# Patient Record
Sex: MALE | Race: WHITE | Employment: FULL TIME | URBAN - METROPOLITAN AREA
[De-identification: names, ages, dates, MRNs, and addresses within clinical notes are randomized per-mention and may not be internally consistent; named-entity substitution may affect disease eponyms.]

---

## 2018-02-05 ENCOUNTER — OFFICE VISIT (OUTPATIENT)
Dept: INTERNAL MEDICINE CLINIC | Age: 53
End: 2018-02-05

## 2018-02-05 VITALS
HEIGHT: 69 IN | BODY MASS INDEX: 22.6 KG/M2 | TEMPERATURE: 98.6 F | DIASTOLIC BLOOD PRESSURE: 92 MMHG | OXYGEN SATURATION: 97 % | HEART RATE: 82 BPM | SYSTOLIC BLOOD PRESSURE: 128 MMHG | WEIGHT: 152.6 LBS

## 2018-02-05 DIAGNOSIS — Z23 NEED FOR VACCINE FOR TD (TETANUS-DIPHTHERIA): ICD-10-CM

## 2018-02-05 DIAGNOSIS — E55.9 VITAMIN D DEFICIENCY: ICD-10-CM

## 2018-02-05 DIAGNOSIS — L30.8 OTHER ECZEMA: ICD-10-CM

## 2018-02-05 DIAGNOSIS — Z12.11 COLON CANCER SCREENING: ICD-10-CM

## 2018-02-05 DIAGNOSIS — B35.1 ONYCHOMYCOSIS: ICD-10-CM

## 2018-02-05 DIAGNOSIS — Z00.00 PHYSICAL EXAM: Primary | ICD-10-CM

## 2018-02-05 DIAGNOSIS — Z80.42 FAMILY HISTORY OF PROSTATE CANCER: ICD-10-CM

## 2018-02-05 RX ORDER — CLOBETASOL PROPIONATE 0.5 MG/G
OINTMENT TOPICAL 2 TIMES DAILY
Qty: 15 G | Refills: 0 | Status: SHIPPED | OUTPATIENT
Start: 2018-02-05 | End: 2018-12-11 | Stop reason: SDUPTHER

## 2018-02-05 NOTE — PROGRESS NOTES
Written by Sylvia Brar, as dictated by Dr. Rowe Crigler, MD.    [facility]  Fco Stewart is a 50 y.o. male. HPI  The patient comes in today to establish care. He is due for a colonoscopy and would like a referral to GI to have one done. His BP is high today at 130/100, 128/92 on repeat. This is unusual for him as his diastolic BP is usually around 80. He has a fhx (father, age 72) of prostate cancer. His PSA was last checked in 2008 and was normal.    He has noticed an itchy rash on his R hand, which is worse in the winter and when he cleans his hands before surgery. He has been applying OTC eczema cream. He has never used a steroid cream.    He has noticed some toenail fungus for the last 3 months, but has not tried anything on it yet. He got his flu shot this season. He is not up to date on his Tdap. He has a hx of vitamin D deficiency but only takes Ca/vitamin D supplements when he remembers. He exercises daily, does not smoke, and drinks alcohol occasionally. Past Surgical History:   Procedure Laterality Date    HX ORTHOPAEDIC  05/2016   Chuy Chandler REFRACTIVE SURGERY      HX TONSIL AND ADENOIDECTOMY         Family History   Problem Relation Age of Onset    Prostate Cancer Father     Prostate Cancer Paternal Grandfather        Social History     Social History    Marital status: UNKNOWN     Spouse name: N/A    Number of children: N/A    Years of education: N/A     Occupational History    Not on file. Social History Main Topics    Smoking status: Never Smoker    Smokeless tobacco: Never Used    Alcohol use No    Drug use: Not on file    Sexual activity: Not on file     Other Topics Concern    Not on file     Social History Narrative       Review of Systems   Constitutional: Negative for malaise/fatigue. HENT: Negative for congestion. Eyes: Negative for blurred vision and pain. Respiratory: Negative for cough and shortness of breath. Cardiovascular: Negative for chest pain and palpitations. Gastrointestinal: Negative for abdominal pain and heartburn. Genitourinary: Negative for frequency and urgency. Musculoskeletal: Negative for joint pain and myalgias. Skin: Positive for itching and rash. Neurological: Negative for dizziness, tingling, sensory change, weakness and headaches. Psychiatric/Behavioral: Negative for depression, memory loss and substance abuse. Visit Vitals    BP (!) 128/92 (BP 1 Location: Right arm, BP Patient Position: Sitting)    Pulse 82    Temp 98.6 °F (37 °C) (Oral)    Ht 5' 9\" (1.753 m)    Wt 152 lb 9.6 oz (69.2 kg)    SpO2 97%    BMI 22.54 kg/m2       Physical Exam   Constitutional: He is oriented to person, place, and time. He appears well-developed and well-nourished. No distress. HENT:   Right Ear: External ear normal.   Left Ear: External ear normal.   Eyes: Conjunctivae and EOM are normal. Right eye exhibits no discharge. Left eye exhibits no discharge. Neck: Normal range of motion. Neck supple. Cardiovascular: Normal rate and regular rhythm. Pulses:       Dorsalis pedis pulses are 2+ on the right side, and 2+ on the left side. Pulmonary/Chest: Effort normal and breath sounds normal. He has no wheezes. Abdominal: Soft. Bowel sounds are normal. There is no tenderness. Lymphadenopathy:     He has no cervical adenopathy. Neurological: He is alert and oriented to person, place, and time. Skin: He is not diaphoretic. Yellowish discoloration of both toe nails. Psychiatric: He has a normal mood and affect. His behavior is normal.   Nursing note and vitals reviewed. ASSESSMENT and PLAN    ICD-10-CM ICD-9-CM    1. Physical exam X72.31 Q65.3 METABOLIC PANEL, COMPREHENSIVE      CBC W/O DIFF      LIPID PANEL      TSH 3RD GENERATION    Complete physical exam done. Basic labs drawn. I would like him to check his BP 5-6 times and send me the readings.    2. Vitamin D deficiency E55.9 268.9 VITAMIN D, 25 HYDROXY    Will recheck vitamin D today. Compliant on vitamin D supplements. 3. Family history of prostate cancer Z80.42 V16.42 PSA, DIAGNOSTIC (PROSTATE SPECIFIC AG)    Will recheck PSA today. 4. Colon cancer screening Z12.11 V76.51 REFERRAL TO GASTROENTEROLOGY    Referred to GI.   5. Other eczema L30.8 692.9 clobetasol (TEMOVATE) 0.05 % ointment sent to pharmacy    Clobetasol cream given. 6. Need for vaccine for TD (tetanus-diphtheria) Z23 V06.5 diph,Pertuss,Acell,,Tet Vac-PF (ADACEL) 2 Lf-(2.5-5-3-5 mcg)-5Lf/0.5 mL susp sent to pharmacy    Tdap sent to pharmacy   7. Onychomycosis B35.1 110.1 efinaconazole (JUBLIA) rene topical solution sent to pharmacy    Jublia given. Suggested 1 part bleach 1 part water foot soak for 10 minutes daily. This plan was reviewed with the patient and patient agrees. All questions were answered. This scribe documentation was reviewed by me and accurately reflects the examination and decisions made by me. This note will not be viewable in [address]

## 2018-02-05 NOTE — PROGRESS NOTES
Pt is here to be seen as a new patient to reestablish care, he was last seen in 2011. He would like to have lab work done along with psa due to family history of prostate cancer. 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 During this visit he is also requesting a colonoscopy referral.   Chief Complaint   Patient presents with   [address]     would like psa due to family history     Other     would like colonoscopy referral.     Rash     for a few years      Visit Vitals    BP (!) 130/100 (BP 1 Location: Right arm, BP Patient Position: Sitting)    Pulse 82    Temp 98.6 °F (37 °C) (Oral)    Ht 5' 9\" (1.753 m)    Wt 152 lb 9.6 oz (69.2 kg)    SpO2 97%    BMI 22.54 kg/m2     1. Have you been to the ER, urgent care clinic since your last visit? Hospitalized since your last visit? No    2. Have you seen or consulted any other health care providers outside of the [address] [place] [address] since your last visit? Include any pap smears or colon screening.  No

## 2018-02-06 LAB
25(OH)D3+25(OH)D2 SERPL-MCNC: 27.2 NG/ML (ref 30–100)
ALBUMIN SERPL-MCNC: 5.1 G/DL (ref 3.5–5.5)
ALBUMIN/GLOB SERPL: 2.2 {RATIO} (ref 1.2–2.2)
ALP SERPL-CCNC: 52 IU/L (ref 39–117)
ALT SERPL-CCNC: 27 IU/L (ref 0–44)
AST SERPL-CCNC: 41 IU/L (ref 0–40)
BILIRUB SERPL-MCNC: 0.5 MG/DL (ref 0–1.2)
BUN SERPL-MCNC: 20 MG/DL (ref 6–24)
BUN/CREAT SERPL: 19 (ref 9–20)
CALCIUM SERPL-MCNC: 9.8 MG/DL (ref 8.7–10.2)
CHLORIDE SERPL-SCNC: 99 MMOL/L (ref 96–106)
CHOLEST SERPL-MCNC: 208 MG/DL (ref 100–199)
CO2 SERPL-SCNC: 27 MMOL/L (ref 18–29)
CREAT SERPL-MCNC: 1.03 MG/DL (ref 0.76–1.27)
GFR SERPLBLD CREATININE-BSD FMLA CKD-EPI: 84 ML/MIN/1.73
GFR SERPLBLD CREATININE-BSD FMLA CKD-EPI: 97 ML/MIN/1.73
GLOBULIN SER CALC-MCNC: 2.3 G/DL (ref 1.5–4.5)
GLUCOSE SERPL-MCNC: 100 MG/DL (ref 65–99)
HCT VFR BLD AUTO: NORMAL %
HDLC SERPL-MCNC: 115 MG/DL
HGB BLD-MCNC: NORMAL G/DL
INTERPRETATION, 910389: NORMAL
LDLC SERPL CALC-MCNC: 85 MG/DL (ref 0–99)
NRBC BLD AUTO-RTO: NORMAL %
PLATELET # BLD AUTO: NORMAL 10*3/UL
POTASSIUM SERPL-SCNC: 5.2 MMOL/L (ref 3.5–5.2)
PROT SERPL-MCNC: 7.4 G/DL (ref 6–8.5)
PSA SERPL-MCNC: 0.9 NG/ML (ref 0–4)
RBC # BLD AUTO: NORMAL 10*6/UL
SODIUM SERPL-SCNC: 142 MMOL/L (ref 134–144)
TRIGL SERPL-MCNC: 40 MG/DL (ref 0–149)
TSH SERPL DL<=0.005 MIU/L-ACNC: 1.34 UIU/ML (ref 0.45–4.5)
VLDLC SERPL CALC-MCNC: 8 MG/DL (ref 5–40)
WBC # BLD AUTO: NORMAL X10E3/UL

## 2018-02-06 NOTE — PROGRESS NOTES
Lupe added to patient labs. Westleyayaz Khan said that the CBC was cancelled due to her not collecting enough of the sample.

## 2018-02-06 NOTE — PROGRESS NOTES
Please add HbA1C to his blood work. Also, find out from Gutierrez Morton why  was his CBC cancelled?

## 2018-02-09 ENCOUNTER — TELEPHONE (OUTPATIENT)
Dept: INTERNAL MEDICINE CLINIC | Age: 53
End: 2018-02-09

## 2018-02-12 DIAGNOSIS — B35.1 ONYCHOMYCOSIS: ICD-10-CM

## 2018-02-12 NOTE — TELEPHONE ENCOUNTER
Pt requesting that this medication be resent to the pharmacy since they have not received the one that was sent on 2/5/18 thank you.

## 2018-02-12 NOTE — PROGRESS NOTES
Yes I sent him a message on Friday on [employer] I also tried calling him and his phone just rang and voicemail was full.

## 2018-02-12 NOTE — PROGRESS NOTES
Spoke with patient and confirmed patient identity x2. Reviewed results and lab work that was needed. Patient will be coming in to have labs redrawn. He states that the Jublia that was sent in was never received by the pharmacy.

## 2018-02-13 ENCOUNTER — DOCUMENTATION ONLY (OUTPATIENT)
Dept: INTERNAL MEDICINE CLINIC | Age: 53
End: 2018-02-13

## 2018-02-13 DIAGNOSIS — R73.09 ELEVATED GLUCOSE: Primary | ICD-10-CM

## 2018-02-13 DIAGNOSIS — Z00.00 PHYSICAL EXAM: ICD-10-CM

## 2018-02-14 LAB
ERYTHROCYTE [DISTWIDTH] IN BLOOD BY AUTOMATED COUNT: 13.5 % (ref 12.3–15.4)
EST. AVERAGE GLUCOSE BLD GHB EST-MCNC: 103 MG/DL
HBA1C MFR BLD: 5.2 % (ref 4.8–5.6)
HCT VFR BLD AUTO: 42.2 % (ref 37.5–51)
HGB BLD-MCNC: 14 G/DL (ref 13–17.7)
MCH RBC QN AUTO: 32.7 PG (ref 26.6–33)
MCHC RBC AUTO-ENTMCNC: 33.2 G/DL (ref 31.5–35.7)
MCV RBC AUTO: 99 FL (ref 79–97)
PLATELET # BLD AUTO: 170 X10E3/UL (ref 150–379)
RBC # BLD AUTO: 4.28 X10E6/UL (ref 4.14–5.8)
WBC # BLD AUTO: 4.6 X10E3/UL (ref 3.4–10.8)

## 2018-02-14 NOTE — PROGRESS NOTES
[place] Both, your vitamin D came back little low. I would suggest taking 1000 I.U daily dose. Total cholesterol in upper normal range. No jyoti to take medication , exercise & low carb diet should work.

## 2018-03-01 ENCOUNTER — DOCUMENTATION ONLY (OUTPATIENT)
Dept: INTERNAL MEDICINE CLINIC | Age: 53
End: 2018-03-01

## 2018-03-23 ENCOUNTER — DOCUMENTATION ONLY (OUTPATIENT)
Dept: INTERNAL MEDICINE CLINIC | Age: 53
End: 2018-03-23

## 2018-03-23 NOTE — PROGRESS NOTES
Extra questions were needed for the prior auth and has been submittied on 3/20/2018 checked web site still unanaswere

## 2018-04-06 ENCOUNTER — DOCUMENTATION ONLY (OUTPATIENT)
Dept: INTERNAL MEDICINE CLINIC | Age: 53
End: 2018-04-06

## 2018-04-16 ENCOUNTER — TELEPHONE (OUTPATIENT)
Dept: INTERNAL MEDICINE CLINIC | Age: 53
End: 2018-04-16

## 2018-04-16 DIAGNOSIS — R20.2 FACIAL PARESTHESIA: Primary | ICD-10-CM

## 2018-04-16 DIAGNOSIS — B35.1 ONYCHOMYCOSIS: ICD-10-CM

## 2018-04-23 ENCOUNTER — OFFICE VISIT (OUTPATIENT)
Dept: NEUROLOGY | Age: 53
End: 2018-04-23

## 2018-04-23 VITALS
DIASTOLIC BLOOD PRESSURE: 82 MMHG | WEIGHT: 155 LBS | BODY MASS INDEX: 22.89 KG/M2 | SYSTOLIC BLOOD PRESSURE: 118 MMHG | HEART RATE: 65 BPM | OXYGEN SATURATION: 98 % | RESPIRATION RATE: 18 BRPM

## 2018-04-23 DIAGNOSIS — R20.0 NUMBNESS OF LEFT JAW: Primary | ICD-10-CM

## 2018-04-23 NOTE — PROGRESS NOTES
Chief Complaint   Patient presents with    Numbness       Referred by: Dr. 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 He tells me for the past 2 weeks he has noticed left sub-mandible numbness while shaving. It occurs exclusively on the left side without change in pattern since being noticed. No pain. He denies any other complaints such as headache or vision changes or weakness or numbness elsewhere. Review of Systems   Neurological: Positive for tingling and sensory change. All other systems reviewed and are negative. History reviewed. No pertinent past medical history. Family History   Problem Relation Age of Onset    Prostate Cancer Father     Prostate Cancer Paternal Grandfather      Social History     Social History    Marital status: UNKNOWN     Spouse name: N/A    Number of children: N/A    Years of education: N/A     Occupational History    Not on file. Social History Main Topics    Smoking status: Never Smoker    Smokeless tobacco: Never Used    Alcohol use No    Drug use: Not on file    Sexual activity: Not on file     Other Topics Concern    Not on file     Social History Narrative     Current Outpatient Prescriptions   Medication Sig    efinaconazole (JUBLIA) rene topical solution Apply to toe nail bid    CALCIUM-VITAMIN D3 PO Take  by mouth.  clobetasol (TEMOVATE) 0.05 % ointment Apply  to affected area two (2) times a day. No current facility-administered medications for this visit. No Known Allergies      Neurologic Exam     Mental Status   Oriented to person, place, and time. Cranial Nerves   Cranial nerves II through XII intact. Motor Exam   Muscle bulk: normal    Strength   Strength 5/5 throughout.      Sensory Exam   Light touch normal.     Gait, Coordination, and Reflexes     Gait  Gait: normal    Coordination   Romberg: negative    Tremor   Resting tremor: absent    Reflexes   Right brachioradialis: 2+  Left brachioradialis: 2+  Right biceps: 2+  Left biceps: 2+  Right triceps: 2+  Left triceps: 2+  Right patellar: 2+  Left patellar: 2+  Right achilles: 2+  Left achilles: 2+    Physical Exam   Constitutional: He is oriented to person, place, and time. Neurological: He is oriented to person, place, and time. He has normal strength. He has a normal Romberg Test. Gait normal.   Reflex Scores:       Tricep reflexes are 2+ on the right side and 2+ on the left side. Bicep reflexes are 2+ on the right side and 2+ on the left side. Brachioradialis reflexes are 2+ on the right side and 2+ on the left side. Patellar reflexes are 2+ on the right side and 2+ on the left side. Achilles reflexes are 2+ on the right side and 2+ on the left side. Visit Vitals    /82    Pulse 65    Resp 18    Wt 70.3 kg (155 lb)    SpO2 98%    BMI 22.89 kg/m2       Lab Results  Component Value Date/Time   WBC 4.6 02/13/2018 08:11 AM   HGB 14.0 02/13/2018 08:11 AM   HCT 42.2 02/13/2018 08:11 AM   PLATELET 828 13/48/7410 08:11 AM   MCV 99 (H) 02/13/2018 08:11 AM     Lab Results  Component Value Date/Time   Hemoglobin A1c 5.2 02/13/2018 08:11 AM   Glucose 100 (H) 02/05/2018 10:03 AM   LDL, calculated 85 02/05/2018 10:03 AM   Creatinine 1.03 02/05/2018 10:03 AM      Lab Results  Component Value Date/Time   Cholesterol, total 208 (H) 02/05/2018 10:03 AM   HDL Cholesterol 115 02/05/2018 10:03 AM   LDL, calculated 85 02/05/2018 10:03 AM   Triglyceride 40 02/05/2018 10:03 AM     Lab Results  Component Value Date/Time   ALT (SGPT) 27 02/05/2018 10:03 AM   AST (SGOT) 41 (H) 02/05/2018 10:03 AM   Alk.  phosphatase 52 02/05/2018 10:03 AM   Bilirubin, total 0.5 02/05/2018 10:03 AM   Albumin 5.1 02/05/2018 10:03 AM   Protein, total 7.4 02/05/2018 10:03 AM   PLATELET 403 25/06/3321 08:11 AM       Lab Results  Component Value Date/Time   Prostate Specific Ag 0.9 02/05/2018 10:03 AM   Prostate Specific Ag 0.7 09/13/2011 05:01 PM        CT Results (maximum last 3): No results found for this or any previous visit. MRI Results (maximum last 3): Results from [place] encounter on 10/09/12   MRI ANKLE LT WO CONT   Narrative **Final Report**      ICD Codes / Adm. Diagnosis: 719.47   / Pain in joint, ankle and foot    Examination:  MR ANKLE MRI WO CON LT  - [id number] - Oct  9 2012  7:05AM  Accession No:  [id number]  Reason:  left ankle pain/tibial stress fracture      REPORT:  INDICATION: Ankle pain following 18 mile run. COMPARISON: None    TECHNIQUE: Axial T2 and proton density fat-saturated; coronal T2   fat-saturated; sagittal T1, T2 fat-saturated, and spoiled gradient-echo   noncontrast  MRI of the left ankle performed on the 3 Nelli magnet. FINDINGS: Indistinct incomplete curvilinear fracture line in the distal   tibial metaphysis parallels the physeal scar. There is surrounding bone   marrow edema. No cortical disruption or intra-articular extension. Remaining bone marrow signal is within normal limits. Extensive strandy soft   tissue swelling. Small volume of fluid in the posterior tibialis and flexor hallucis longus   tendon sheaths. Small tibiotalar joint effusion. Flexor digitorum longus   tendon is intact. Anterior extensor and peroneus tendons are intact. The   Achilles tendon is unremarkable. The ATFL is intact. The deltoid ligament is intact. The Lisfranc ligament   is intact. The sinus tarsi is within normal limits. The plantar fascia is   unremarkable. The visualized musculature is age-appropriate. The joint spaces are preserved. IMPRESSION:   1. Incomplete nondisplaced fracture of the distal tibial metaphysis with   surrounding bone marrow edema. 2. Mild PTT and FHL tenosynovitis may communicate with the small tibiotalar   joint effusion. 3. Diffuse soft tissue swelling. 4. Intact ligaments and remaining tendons.            Signing/Reading Doctor: Toney Suarez ([id number]) Filippodinorah Berkowitz ([id number])  10/09/2012                                      PET Results (maximum last 3): No results found for this or any previous visit. Assessment and Plan   Diagnoses and all orders for this visit:    1. Numbness of left jaw  -     MRI BRAIN W WO CONT; Future      50year-old gentleman complaining of new left submandibular paresthesias. Examination has some decreased sensation in left V3 but all else is intact. He has a family history for prostate cancer and is being actively screened for this condition and his presentation in question could be possible demonstration of paraneoplasm. I am going to have him complete an MRI brain with contrast attention to the brainstem. We will be in touch afterwards. Questions were answered. A notice of this visit/encounter being completed has been sent electronically to the patient's PCP and/or referring provider.      [facility] [address]  Diplomate STACIA

## 2018-04-23 NOTE — MR AVS SNAPSHOT
[place] [address] 
[phone] Patient: Marietta Giordano MRN: [id number] :1965 Visit Information Date & Time Provider Department Dept. Phone Encounter #  
 2018  8:00 AM Lilibeth Vee DO [facility] at [address] [place] [address] Road [id number] Follow-up Instructions Return if symptoms worsen or fail to improve. Routing History Upcoming Health Maintenance Date Due FOBT Q 1 YEAR AGE 50-75 2017 DTaP/Tdap/Td series (2 - Td) 2028 Allergies as of 2018  Review Complete On: 2018 By: Ashutosh Delgado LPN No Known Allergies Current Immunizations  Never Reviewed No immunizations on file. Not reviewed this visit You Were Diagnosed With   
  
 Codes Comments Numbness of left jaw    -  Primary ICD-10-CM: R20.0 ICD-9-CM: 708. 0 Vitals BP Pulse Resp Weight(growth percentile) SpO2 BMI  
 118/82 65 18 155 lb (70.3 kg) 98% 22.89 kg/m2 Smoking Status Never Smoker Vitals History BMI and BSA Data Body Mass Index Body Surface Area  
 22.89 kg/m 2 1.85 m 2 Preferred Pharmacy Pharmacy Name Phone [id number] [place] [address] [phone] Your Updated Medication List  
  
   
This list is accurate as of 18  8:22 AM.  Always use your most recent med list.  
  
  
  
  
 CALCIUM-VITAMIN D3 PO Take  by mouth.  
  
 clobetasol 0.05 % ointment Commonly known as:  Anice Plough Apply  to affected area two (2) times a day.  
  
 efinaconazole Gwendolyn topical solution Commonly known as:  Elwanda Maria T Apply to toe nail bid Follow-up Instructions Return if symptoms worsen or fail to improve. To-Do List   
 2018 Imaging:  MRI BRAIN W WO CONT Patient Instructions A Healthy Lifestyle: Care Instructions Your Care Instructions A healthy lifestyle can help you feel good, stay at a healthy weight, and have plenty of energy for both work and play. A healthy lifestyle is something you can share with your whole family. A healthy lifestyle also can lower your risk for serious health problems, such as high blood pressure, heart disease, and diabetes. You can follow a few steps listed below to improve your health and the health of your family. Follow-up care is a key part of your treatment and safety. Be sure to make and go to all appointments, and call your doctor if you are having problems. It's also a good idea to know your test results and keep a list of the medicines you take. How can you care for yourself at home? · Do not eat too much sugar, fat, or fast foods. You can still have dessert and treats now and then. The goal is moderation. · Start small to improve your eating habits. Pay attention to portion sizes, drink less juice and soda pop, and eat more fruits and vegetables. ¨ Eat a healthy amount of food. A 3-ounce serving of meat, for example, is about the size of a deck of cards. Fill the rest of your plate with vegetables and whole grains. ¨ Limit the amount of soda and sports drinks you have every day. Drink more water when you are thirsty. ¨ Eat at least 5 servings of fruits and vegetables every day. It may seem like a lot, but it is not hard to reach this goal. A serving or helping is 1 piece of fruit, 1 cup of vegetables, or 2 cups of leafy, raw vegetables. Have an apple or some carrot sticks as an afternoon snack instead of a candy bar. Try to have fruits and/or vegetables at every meal. 
· Make exercise part of your daily routine. You may want to start with simple activities, such as walking, bicycling, or slow swimming. Try to be active 30 to 60 minutes every day.  You do not need to do all 30 to 60 minutes all at once. For example, you can exercise 3 times a day for 10 or 20 minutes. Moderate exercise is safe for most people, but it is always a good idea to talk to your doctor before starting an exercise program. 
· Keep moving. Kenia Hunger the lawn, work in the garden, or [employer]. Take the stairs instead of the elevator at work. · If you smoke, quit. People who smoke have an increased risk for heart attack, stroke, cancer, and other lung illnesses. Quitting is hard, but there are ways to boost your chance of quitting tobacco for good. ¨ Use nicotine gum, patches, or lozenges. ¨ Ask your doctor about stop-smoking programs and medicines. ¨ Keep trying. In addition to reducing your risk of diseases in the future, you will notice some benefits soon after you stop using tobacco. If you have shortness of breath or asthma symptoms, they will likely get better within a few weeks after you quit. · Limit how much alcohol you drink. Moderate amounts of alcohol (up to 2 drinks a day for men, 1 drink a day for women) are okay. But drinking too much can lead to liver problems, high blood pressure, and other health problems. Family health If you have a family, there are many things you can do together to improve your health. · Eat meals together as a family as often as possible. · Eat healthy foods. This includes fruits, vegetables, lean meats and dairy, and whole grains. · Include your family in your fitness plan. Most people think of activities such as jogging or tennis as the way to fitness, but there are many ways you and your family can be more active. Anything that makes you breathe hard and gets your heart pumping is exercise. Here are some tips: 
¨ Walk to do errands or to take your child to school or the bus. ¨ Go for a family bike ride after dinner instead of watching TV. Where can you learn more? Go to http://noa-zaira.info/. Enter A484 in the search box to learn more about \"A Healthy Lifestyle: Care Instructions. \" Current as of: May 12, 2017 Content Version: 11.4 © 8960-1136 [employer] Care instructions adapted under license by [employer] (which disclaims liability or warranty for this information). If you have questions about a medical condition or this instruction, always ask your healthcare professional. [address] any warranty or liability for your use of this information. Introducing [facility]! Dear Mendoza Vidal: 
Thank you for requesting a [employer] account. Our records indicate that you already have an active [employer] account. You can access your account anytime at [email or web address]. [employer] Did you know that you can access your hospital and ER discharge instructions at any time in [employer]? You can also review all of your test results from your hospital stay or ER visit. Additional Information If you have questions, please visit the Frequently Asked Questions section of the [employer] website at [email or web address] Remember, [employer] is NOT to be used for urgent needs. For medical emergencies, dial 911. Now available from your iPhone and Android! Please provide this summary of care documentation to your next provider. Your primary care clinician is listed as Cheryle Ahn. If you have any questions after today's visit, please call [phone].

## 2018-04-23 NOTE — PATIENT INSTRUCTIONS

## 2018-04-25 ENCOUNTER — HOSPITAL ENCOUNTER (OUTPATIENT)
Dept: MRI IMAGING | Age: 53
Discharge: HOME | End: 2018-04-25
Attending: PSYCHIATRY & NEUROLOGY
Payer: COMMERCIAL

## 2018-04-25 VITALS — BODY MASS INDEX: 22.89 KG/M2 | WEIGHT: 155 LBS

## 2018-04-25 DIAGNOSIS — R20.0 NUMBNESS OF LEFT JAW: ICD-10-CM

## 2018-04-25 PROCEDURE — A9575 INJ GADOTERATE MEGLUMI 0.1ML: HCPCS | Performed by: PSYCHIATRY & NEUROLOGY

## 2018-04-25 PROCEDURE — 70553 MRI BRAIN STEM W/O & W/DYE: CPT

## 2018-04-25 PROCEDURE — 74011250636 HC RX REV CODE- 250/636: Performed by: PSYCHIATRY & NEUROLOGY

## 2018-04-25 RX ORDER — GADOTERATE MEGLUMINE 376.9 MG/ML
14 INJECTION INTRAVENOUS
Status: COMPLETED | OUTPATIENT
Start: 2018-04-25 | End: 2018-04-25

## 2018-04-25 RX ADMIN — GADOTERATE MEGLUMINE 14 ML: 376.9 INJECTION INTRAVENOUS at 09:00

## 2018-12-11 DIAGNOSIS — L30.8 OTHER ECZEMA: ICD-10-CM

## 2018-12-11 RX ORDER — CLOBETASOL PROPIONATE 0.5 MG/G
OINTMENT TOPICAL 2 TIMES DAILY
Qty: 30 G | Refills: 1 | Status: SHIPPED | OUTPATIENT
Start: 2018-12-11 | End: 2022-05-19 | Stop reason: SDUPTHER

## 2018-12-21 ENCOUNTER — HOSPITAL ENCOUNTER (OUTPATIENT)
Dept: PREADMISSION TESTING | Age: 53
Discharge: HOME | End: 2018-12-21
Payer: COMMERCIAL

## 2018-12-21 ENCOUNTER — OFFICE VISIT (OUTPATIENT)
Dept: SURGERY | Age: 53
End: 2018-12-21

## 2018-12-21 VITALS
SYSTOLIC BLOOD PRESSURE: 135 MMHG | OXYGEN SATURATION: 99 % | WEIGHT: 150 LBS | RESPIRATION RATE: 16 BRPM | TEMPERATURE: 97.8 F | BODY MASS INDEX: 22.22 KG/M2 | HEART RATE: 69 BPM | DIASTOLIC BLOOD PRESSURE: 75 MMHG | HEIGHT: 69 IN

## 2018-12-21 VITALS
TEMPERATURE: 97.4 F | SYSTOLIC BLOOD PRESSURE: 134 MMHG | RESPIRATION RATE: 18 BRPM | BODY MASS INDEX: 22.67 KG/M2 | DIASTOLIC BLOOD PRESSURE: 77 MMHG | HEART RATE: 62 BPM | HEIGHT: 69 IN | OXYGEN SATURATION: 98 % | WEIGHT: 153.06 LBS

## 2018-12-21 DIAGNOSIS — K40.90 RIGHT INGUINAL HERNIA: Primary | ICD-10-CM

## 2018-12-21 LAB
ALBUMIN SERPL-MCNC: 4.5 G/DL (ref 3.5–5)
ALBUMIN/GLOB SERPL: 1.6 {RATIO} (ref 1.1–2.2)
ALP SERPL-CCNC: 42 U/L (ref 45–117)
ALT SERPL-CCNC: 50 U/L (ref 12–78)
ANION GAP SERPL CALC-SCNC: 9 MMOL/L (ref 5–15)
AST SERPL-CCNC: 47 U/L (ref 15–37)
BASOPHILS # BLD: 0 K/UL (ref 0–0.1)
BASOPHILS NFR BLD: 1 % (ref 0–1)
BILIRUB SERPL-MCNC: 0.8 MG/DL (ref 0.2–1)
BUN SERPL-MCNC: 25 MG/DL (ref 6–20)
BUN/CREAT SERPL: 23 (ref 12–20)
CALCIUM SERPL-MCNC: 9.6 MG/DL (ref 8.5–10.1)
CHLORIDE SERPL-SCNC: 100 MMOL/L (ref 97–108)
CO2 SERPL-SCNC: 28 MMOL/L (ref 21–32)
CREAT SERPL-MCNC: 1.09 MG/DL (ref 0.7–1.3)
DIFFERENTIAL METHOD BLD: ABNORMAL
EOSINOPHIL # BLD: 0 K/UL (ref 0–0.4)
EOSINOPHIL NFR BLD: 1 % (ref 0–7)
ERYTHROCYTE [DISTWIDTH] IN BLOOD BY AUTOMATED COUNT: 12.5 % (ref 11.5–14.5)
GLOBULIN SER CALC-MCNC: 2.8 G/DL (ref 2–4)
GLUCOSE SERPL-MCNC: 77 MG/DL (ref 65–100)
HCT VFR BLD AUTO: 41.7 % (ref 36.6–50.3)
HGB BLD-MCNC: 14.1 G/DL (ref 12.1–17)
IMM GRANULOCYTES # BLD: 0 K/UL (ref 0–0.04)
IMM GRANULOCYTES NFR BLD AUTO: 0 % (ref 0–0.5)
LYMPHOCYTES # BLD: 0.7 K/UL (ref 0.8–3.5)
LYMPHOCYTES NFR BLD: 20 % (ref 12–49)
MCH RBC QN AUTO: 33.1 PG (ref 26–34)
MCHC RBC AUTO-ENTMCNC: 33.8 G/DL (ref 30–36.5)
MCV RBC AUTO: 97.9 FL (ref 80–99)
MONOCYTES # BLD: 0.4 K/UL (ref 0–1)
MONOCYTES NFR BLD: 12 % (ref 5–13)
NEUTS SEG # BLD: 2.4 K/UL (ref 1.8–8)
NEUTS SEG NFR BLD: 66 % (ref 32–75)
NRBC # BLD: 0 K/UL (ref 0–0.01)
NRBC BLD-RTO: 0 PER 100 WBC
PLATELET # BLD AUTO: 164 K/UL (ref 150–400)
PMV BLD AUTO: 10.1 FL (ref 8.9–12.9)
POTASSIUM SERPL-SCNC: 4.6 MMOL/L (ref 3.5–5.1)
PROT SERPL-MCNC: 7.3 G/DL (ref 6.4–8.2)
RBC # BLD AUTO: 4.26 M/UL (ref 4.1–5.7)
SODIUM SERPL-SCNC: 137 MMOL/L (ref 136–145)
WBC # BLD AUTO: 3.6 K/UL (ref 4.1–11.1)

## 2018-12-21 PROCEDURE — 85025 COMPLETE CBC W/AUTO DIFF WBC: CPT

## 2018-12-21 PROCEDURE — 36415 COLL VENOUS BLD VENIPUNCTURE: CPT

## 2018-12-21 PROCEDURE — 93005 ELECTROCARDIOGRAM TRACING: CPT

## 2018-12-21 PROCEDURE — 80053 COMPREHEN METABOLIC PANEL: CPT

## 2018-12-21 RX ORDER — BISMUTH SUBSALICYLATE 262 MG
1 TABLET,CHEWABLE ORAL DAILY
COMMUNITY

## 2018-12-21 NOTE — H&P (VIEW-ONLY)
Surgery Consult Subjective:  
 
Kindra Carlson is a 48 y.o.  male who was referred for evaluation of  right inguinal hernia. Symptoms were first noted several months ago. Symptoms include a burning pain. Pain episodes have increased recently. He has to reduce the hernia to relieve the pain. Patient does not have symptoms of chronic constipation, chronic cough, difficulty urinating. Patient does not have a history of previous hernia surgery. He denies a change in bowel habits. Past Medical History:  
Diagnosis Date  Facial numbness 2018  Fungal infection 2018  
 toe Past Surgical History:  
Procedure Laterality Date [address]  HX COLONOSCOPY  2018  HX KNEE ARTHROSCOPY Left 2016 [address] [facility] ORIF left knee [address] Family History Problem Relation Age of Onset  Prostate Cancer Father  Prostate Cancer Paternal Grandfather Social History Tobacco Use  Smoking status: Never Smoker  Smokeless tobacco: Never Used Substance Use Topics  Alcohol use: Yes Alcohol/week: 0.6 oz Types: 1 Cans of beer per week Comment: 15 beer per week Current Outpatient Medications Medication Sig  
 multivitamin (ONE A DAY) tablet Take 1 Tab by mouth daily.  clobetasol (TEMOVATE) 0.05 % ointment Apply  to affected area two (2) times a day. (Patient taking differently: Apply  to affected area two (2) times daily as needed.)  CALCIUM-VITAMIN D3 PO Take 1 Tab by mouth daily.  efinaconazole (JUBLIA) rene topical solution Apply to toe nail bid No current facility-administered medications for this visit. No Known Allergies Review of Systems: A comprehensive review of systems was negative except for that written in the History of Present Illness.  
 
Objective:  
 
Blood pressure 135/75, pulse 69, temperature 97.8 °F (36.6 °C), temperature source Oral, resp. rate 16, height 5' 9\" (1.753 m), weight 150 lb (68 kg), SpO2 99 %. Physical Exam: 
General:  Alert, cooperative, no distress, appears stated age. Eyes:  Conjunctivae/corneas clear., EOMs intact. Lungs:   Clear to auscultation bilaterally. Heart:  Regular rate and rhythm, S1, S2 normal, no murmur, click, rub or gallop. Abdomen:   Soft, non-tender. Bowel sounds normal. No masses,  No organomegaly. : Reducible RIH, no palpable LIH Neurologic: AOX3. Normal strength, sensation throughout. Labs:  
Recent Results (from the past 24 hour(s)) CBC WITH AUTOMATED DIFF Collection Time: 12/21/18  2:29 PM  
Result Value Ref Range WBC 3.6 (L) 4.1 - 11.1 K/uL  
 RBC 4.26 4.10 - 5.70 M/uL  
 HGB 14.1 12.1 - 17.0 g/dL HCT 41.7 36.6 - 50.3 % MCV 97.9 80.0 - 99.0 FL  
 MCH 33.1 26.0 - 34.0 PG  
 MCHC 33.8 30.0 - 36.5 g/dL  
 RDW 12.5 11.5 - 14.5 % PLATELET 709 392 - 291 K/uL MPV 10.1 8.9 - 12.9 FL  
 NRBC 0.0 0  WBC ABSOLUTE NRBC 0.00 0.00 - 0.01 K/uL NEUTROPHILS 66 32 - 75 % LYMPHOCYTES 20 12 - 49 % MONOCYTES 12 5 - 13 % EOSINOPHILS 1 0 - 7 % BASOPHILS 1 0 - 1 % IMMATURE GRANULOCYTES 0 0.0 - 0.5 % ABS. NEUTROPHILS 2.4 1.8 - 8.0 K/UL  
 ABS. LYMPHOCYTES 0.7 (L) 0.8 - 3.5 K/UL  
 ABS. MONOCYTES 0.4 0.0 - 1.0 K/UL  
 ABS. EOSINOPHILS 0.0 0.0 - 0.4 K/UL  
 ABS. BASOPHILS 0.0 0.0 - 0.1 K/UL  
 ABS. IMM. GRANS. 0.0 0.00 - 0.04 K/UL  
 DF AUTOMATED METABOLIC PANEL, COMPREHENSIVE Collection Time: 12/21/18  2:29 PM  
Result Value Ref Range Sodium 137 136 - 145 mmol/L Potassium 4.6 3.5 - 5.1 mmol/L Chloride 100 97 - 108 mmol/L  
 CO2 28 21 - 32 mmol/L Anion gap 9 5 - 15 mmol/L Glucose 77 65 - 100 mg/dL BUN 25 (H) 6 - 20 MG/DL Creatinine 1.09 0.70 - 1.30 MG/DL  
 BUN/Creatinine ratio 23 (H) 12 - 20 GFR est AA >60 >60 ml/min/1.73m2 GFR est non-AA >60 >60 ml/min/1.73m2  Calcium 9.6 8.5 - 10.1 MG/DL  
 Bilirubin, total 0.8 0.2 - 1.0 MG/DL  
 ALT (SGPT) 50 12 - 78 U/L  
 AST (SGOT) 47 (H) 15 - 37 U/L Alk. phosphatase 42 (L) 45 - 117 U/L Protein, total 7.3 6.4 - 8.2 g/dL Albumin 4.5 3.5 - 5.0 g/dL Globulin 2.8 2.0 - 4.0 g/dL A-G Ratio 1.6 1.1 - 2.2 EKG, 12 LEAD, INITIAL Collection Time: 12/21/18  2:42 PM  
Result Value Ref Range Ventricular Rate 57 BPM  
 Atrial Rate 57 BPM  
 P-R Interval 208 ms QRS Duration 90 ms Q-T Interval 410 ms QTC Calculation (Bezet) 399 ms Calculated P Axis 70 degrees Calculated R Axis 91 degrees Calculated T Axis 44 degrees Diagnosis Sinus bradycardia Rightward axis Moderate voltage criteria for LVH, may be normal variant Borderline ECG No previous ECGs available Assessment:  
 
Right inguinal hernia, easily reducible. Plan: 1. Discussed possibility of incarceration, strangulation, enlargement in size over time, and the risk of emergency surgery in the face of strangulation. Also discussed the risk of surgery including recurrence which can be up to 50% in the case of incisional or complex hernias, use of prosthetic materials (mesh) and the increased risk of infection, postoperative infection and the possible need for reoperation and removal of mesh if used, possibility of postoperative small bowel obstruction or ileus, and the risks of general anesthetic. The patient understands the risks; any and all questions were answered to the patient's satisfaction. 2. We will proceed with conservative/ expectant treatment. Signed By: Tiffanie Celeste MD   
 December 21, 2018

## 2018-12-21 NOTE — PROGRESS NOTES
Surgery Consult    Subjective:     Iveth Alcala is a 48 y.o.  male who was referred for evaluation of  right inguinal hernia. Symptoms were first noted several months ago. Symptoms include a burning pain. Pain episodes have increased recently. He has to reduce the hernia to relieve the pain. Patient does not have symptoms of chronic constipation, chronic cough, difficulty urinating. Patient does not have a history of previous hernia surgery. He denies a change in bowel habits. Past Medical History:   Diagnosis Date    Facial numbness 2018    Fungal infection 2018    toe     Past Surgical History:   Procedure Laterality Date    HX ADENOIDECTOMY  1972    HX COLONOSCOPY  2018    HX KNEE ARTHROSCOPY Left 2016    HX ORTHOPAEDIC Left 1999    ORIF left knee    HX TONSILLECTOMY  1972      Family History   Problem Relation Age of Onset    Prostate Cancer Father     Prostate Cancer Paternal Grandfather       Social History     Tobacco Use    Smoking status: Never Smoker    Smokeless tobacco: Never Used   Substance Use Topics    Alcohol use: Yes     Alcohol/week: 0.6 oz     Types: 1 Cans of beer per week     Comment: 15 beer per week      Current Outpatient Medications   Medication Sig    multivitamin (ONE A DAY) tablet Take 1 Tab by mouth daily.  clobetasol (TEMOVATE) 0.05 % ointment Apply  to affected area two (2) times a day. (Patient taking differently: Apply  to affected area two (2) times daily as needed.)    CALCIUM-VITAMIN D3 PO Take 1 Tab by mouth daily.  efinaconazole (JUBLIA) rene topical solution Apply to toe nail bid     No current facility-administered medications for this visit. No Known Allergies     Review of Systems:  A comprehensive review of systems was negative except for that written in the History of Present Illness. Objective:     Blood pressure 135/75, pulse 69, temperature 97.8 °F (36.6 °C), temperature source Oral, resp.  rate 16, height 5' 9\" (1.753 m), weight 150 lb (68 kg), SpO2 99 %. Physical Exam:  General:  Alert, cooperative, no distress, appears stated age. Eyes:  Conjunctivae/corneas clear., EOMs intact. Lungs:   Clear to auscultation bilaterally. Heart:  Regular rate and rhythm, S1, S2 normal, no murmur, click, rub or gallop. Abdomen:   Soft, non-tender. Bowel sounds normal. No masses,  No organomegaly. : Reducible RIH, no palpable LIH   Neurologic: AOX3. Normal strength, sensation throughout. Labs:   Recent Results (from the past 24 hour(s))   CBC WITH AUTOMATED DIFF    Collection Time: 12/21/18  2:29 PM   Result Value Ref Range    WBC 3.6 (L) 4.1 - 11.1 K/uL    RBC 4.26 4.10 - 5.70 M/uL    HGB 14.1 12.1 - 17.0 g/dL    HCT 41.7 36.6 - 50.3 %    MCV 97.9 80.0 - 99.0 FL    MCH 33.1 26.0 - 34.0 PG    MCHC 33.8 30.0 - 36.5 g/dL    RDW 12.5 11.5 - 14.5 %    PLATELET 999 192 - 968 K/uL    MPV 10.1 8.9 - 12.9 FL    NRBC 0.0 0  WBC    ABSOLUTE NRBC 0.00 0.00 - 0.01 K/uL    NEUTROPHILS 66 32 - 75 %    LYMPHOCYTES 20 12 - 49 %    MONOCYTES 12 5 - 13 %    EOSINOPHILS 1 0 - 7 %    BASOPHILS 1 0 - 1 %    IMMATURE GRANULOCYTES 0 0.0 - 0.5 %    ABS. NEUTROPHILS 2.4 1.8 - 8.0 K/UL    ABS. LYMPHOCYTES 0.7 (L) 0.8 - 3.5 K/UL    ABS. MONOCYTES 0.4 0.0 - 1.0 K/UL    ABS. EOSINOPHILS 0.0 0.0 - 0.4 K/UL    ABS. BASOPHILS 0.0 0.0 - 0.1 K/UL    ABS. IMM.  GRANS. 0.0 0.00 - 0.04 K/UL    DF AUTOMATED     METABOLIC PANEL, COMPREHENSIVE    Collection Time: 12/21/18  2:29 PM   Result Value Ref Range    Sodium 137 136 - 145 mmol/L    Potassium 4.6 3.5 - 5.1 mmol/L    Chloride 100 97 - 108 mmol/L    CO2 28 21 - 32 mmol/L    Anion gap 9 5 - 15 mmol/L    Glucose 77 65 - 100 mg/dL    BUN 25 (H) 6 - 20 MG/DL    Creatinine 1.09 0.70 - 1.30 MG/DL    BUN/Creatinine ratio 23 (H) 12 - 20      GFR est AA >60 >60 ml/min/1.73m2    GFR est non-AA >60 >60 ml/min/1.73m2    Calcium 9.6 8.5 - 10.1 MG/DL    Bilirubin, total 0.8 0.2 - 1.0 MG/DL    ALT (SGPT) 50 12 - 78 U/L    AST (SGOT) 47 (H) 15 - 37 U/L    Alk. phosphatase 42 (L) 45 - 117 U/L    Protein, total 7.3 6.4 - 8.2 g/dL    Albumin 4.5 3.5 - 5.0 g/dL    Globulin 2.8 2.0 - 4.0 g/dL    A-G Ratio 1.6 1.1 - 2.2     EKG, 12 LEAD, INITIAL    Collection Time: 12/21/18  2:42 PM   Result Value Ref Range    Ventricular Rate 57 BPM    Atrial Rate 57 BPM    P-R Interval 208 ms    QRS Duration 90 ms    Q-T Interval 410 ms    QTC Calculation (Bezet) 399 ms    Calculated P Axis 70 degrees    Calculated R Axis 91 degrees    Calculated T Axis 44 degrees    Diagnosis       Sinus bradycardia  Rightward axis  Moderate voltage criteria for LVH, may be normal variant  Borderline ECG  No previous ECGs available       Assessment:     Right inguinal hernia, easily reducible. Plan:     1. Discussed possibility of incarceration, strangulation, enlargement in size over time, and the risk of emergency surgery in the face of strangulation. Also discussed the risk of surgery including recurrence which can be up to 50% in the case of incisional or complex hernias, use of prosthetic materials (mesh) and the increased risk of infection, postoperative infection and the possible need for reoperation and removal of mesh if used, possibility of postoperative small bowel obstruction or ileus, and the risks of general anesthetic. The patient understands the risks; any and all questions were answered to the patient's satisfaction. 2. We will proceed with conservative/ expectant treatment.       Signed By: Kortney Charles MD     December 21, 2018

## 2018-12-21 NOTE — PROGRESS NOTES
1. Have you been to the ER, urgent care clinic since your last visit? Hospitalized since your last visit? No    2. Have you seen or consulted any other health care providers outside of the [address] [place] [address] since your last visit? Include any pap smears or colon screening.  No

## 2018-12-21 NOTE — PERIOP NOTES
[address], [address] [facility]                            MAIN OR                                  [phone]   MAIN PRE OP                          [phone]                                                                                AMBULATORY PRE OP          [phone]  PRE-ADMISSION TESTING    [phone]     Surgery Date:   Friday 1/4/19       Is surgery arrival time given by surgeon? NO  If NO, [facility] staff will call you between 3 and 7pm the day before your surgery with your arrival time. (If your surgery is on a Monday, we will call you the Friday before.)    Call [phone] after 7pm Monday-Friday if you did not receive your arrival time. INSTRUCTIONS BEFORE YOUR SURGERY   When You  Arrive   Arrive at the [address] [facility] on the day of your surgery  Have your insurance card, photo ID, and any copayment (if needed)     Food   and   Drink   NO food or drink after midnight the night before surgery    This means NO water, gum, mints, coffee, juice, etc.  No alcohol (beer, wine, liquor) 24 hours before and after surgery     Medications to   TAKE   Morning of Surgery   MEDICATIONS TO TAKE THE MORNING OF SURGERY WITH A SIP OF WATER:    None     Medications  To  STOP      7 days before surgery    Non-Steroidal anti-inflammatory Drugs (NSAID's): for example, Ibuprofen (Advil, Motrin), Naproxen (Aleve)   Aspirin, if taking for pain    Herbal supplements, vitamins, and fish oil     Blood  Thinners  If you take  Aspirin, Plavix, Coumadin, or any blood-thinning or anti-blood clot medicine, talk to the doctor who prescribed the medications for pre-operative instructions.      Bathing Clothing  Jewelry  Valuables       If you shower the morning of surgery, please do not apply anything to your skin th lotions, powders, deodorant, or makeup, especially mascara)   Do not shave or trim anywhere 24 hours before surgery   Wear your hair loose or down; no pony-tails, buns, or metal hair clips   Wear loose, comfortable, clean clothes   Wear glasses instead of contacts   Leave money, valuables, and jewelry, including body piercings, at home     Going Home       or Spending the Night    SAME-DAY SURGERY: You must have a responsible adult drive you home and stay with you 24 hours after surgery   ADMITS: If your doctor is keeping you into the hospital after surgery, leave personal belongings/luggage in your car until you have a hospital room number. Hospital discharge time is 12 noon  Drivers must be here before 12 noon unless you are told differently   Special Instructions Free  parking 7a-5pm. Bring completed medication list on day of surgery         Follow all instructions so your surgery wont be cancelled. Please, be on time. If a situation occurs and you are delayed the day of surgery, call [phone] or          [phone]. If your physical condition changes (like a fever, cold, flu, etc.) call your surgeon. The patient was contacted  in person. The patient verbalizes understanding of all instructions and does not  need reinforcement.

## 2018-12-23 LAB
ATRIAL RATE: 57 BPM
CALCULATED P AXIS, ECG09: 70 DEGREES
CALCULATED R AXIS, ECG10: 91 DEGREES
CALCULATED T AXIS, ECG11: 44 DEGREES
DIAGNOSIS, 93000: NORMAL
P-R INTERVAL, ECG05: 208 MS
Q-T INTERVAL, ECG07: 410 MS
QRS DURATION, ECG06: 90 MS
QTC CALCULATION (BEZET), ECG08: 399 MS
VENTRICULAR RATE, ECG03: 57 BPM

## 2019-01-03 ENCOUNTER — ANESTHESIA EVENT (OUTPATIENT)
Dept: SURGERY | Age: 54
End: 2019-01-03
Payer: COMMERCIAL

## 2019-01-04 ENCOUNTER — ANESTHESIA (OUTPATIENT)
Dept: SURGERY | Age: 54
End: 2019-01-04
Payer: COMMERCIAL

## 2019-01-04 ENCOUNTER — HOSPITAL ENCOUNTER (OUTPATIENT)
Age: 54
Setting detail: OUTPATIENT SURGERY
Discharge: HOME | End: 2019-01-04
Attending: SURGERY | Admitting: SURGERY
Payer: COMMERCIAL

## 2019-01-04 VITALS
DIASTOLIC BLOOD PRESSURE: 77 MMHG | HEART RATE: 47 BPM | RESPIRATION RATE: 10 BRPM | SYSTOLIC BLOOD PRESSURE: 127 MMHG | OXYGEN SATURATION: 93 % | TEMPERATURE: 98.2 F

## 2019-01-04 DIAGNOSIS — K40.20 NON-RECURRENT BILATERAL INGUINAL HERNIA WITHOUT OBSTRUCTION OR GANGRENE: Primary | ICD-10-CM

## 2019-01-04 PROCEDURE — 77030016151 HC PROTCTR LNS DFOG COVD -B: Performed by: SURGERY

## 2019-01-04 PROCEDURE — 76060000034 HC ANESTHESIA 1.5 TO 2 HR: Performed by: SURGERY

## 2019-01-04 PROCEDURE — 77030018673: Performed by: SURGERY

## 2019-01-04 PROCEDURE — 74011250636 HC RX REV CODE- 250/636

## 2019-01-04 PROCEDURE — 77030035277 HC OBTRTR BLDELSS DISP INTU -B: Performed by: SURGERY

## 2019-01-04 PROCEDURE — 76210000020 HC REC RM PH II FIRST 0.5 HR: Performed by: SURGERY

## 2019-01-04 PROCEDURE — 77030002933 HC SUT MCRYL J&J -A: Performed by: SURGERY

## 2019-01-04 PROCEDURE — 77030011640 HC PAD GRND REM COVD -A: Performed by: SURGERY

## 2019-01-04 PROCEDURE — 77030020703 HC SEAL CANN DISP INTU -B: Performed by: SURGERY

## 2019-01-04 PROCEDURE — 77030039266 HC ADH SKN EXOFIN S2SG -A: Performed by: SURGERY

## 2019-01-04 PROCEDURE — 77030008684 HC TU ET CUF COVD -B: Performed by: NURSE ANESTHETIST, CERTIFIED REGISTERED

## 2019-01-04 PROCEDURE — 77030033188 HC TBNG FLTRD BIIFUR DISP CNMD -C: Performed by: SURGERY

## 2019-01-04 PROCEDURE — 74011250636 HC RX REV CODE- 250/636: Performed by: SURGERY

## 2019-01-04 PROCEDURE — 76210000006 HC OR PH I REC 0.5 TO 1 HR: Performed by: SURGERY

## 2019-01-04 PROCEDURE — 74011250636 HC RX REV CODE- 250/636: Performed by: ANESTHESIOLOGY

## 2019-01-04 PROCEDURE — 77030032490 HC SLV COMPR SCD KNE COVD -B: Performed by: SURGERY

## 2019-01-04 PROCEDURE — 77030022704 HC SUT VLOC COVD -B: Performed by: SURGERY

## 2019-01-04 PROCEDURE — 77030026438 HC STYL ET INTUB CARD -A: Performed by: NURSE ANESTHETIST, CERTIFIED REGISTERED

## 2019-01-04 PROCEDURE — 77030037032 HC INSRT SCIS CLICKLLINE DISP STOR -B: Performed by: SURGERY

## 2019-01-04 PROCEDURE — 77030002895 HC DEV VASC CLOSR COVD -B: Performed by: SURGERY

## 2019-01-04 PROCEDURE — C9290 INJ, BUPIVACAINE LIPOSOME: HCPCS | Performed by: SURGERY

## 2019-01-04 PROCEDURE — 76010000875 HC OR TIME 1.5 TO 2HR INTENSV - TIER 2: Performed by: SURGERY

## 2019-01-04 PROCEDURE — 77030031139 HC SUT VCRL2 J&J -A: Performed by: SURGERY

## 2019-01-04 PROCEDURE — 77030034850: Performed by: SURGERY

## 2019-01-04 PROCEDURE — 77030018836 HC SOL IRR NACL ICUM -A: Performed by: SURGERY

## 2019-01-04 PROCEDURE — C1781 MESH (IMPLANTABLE): HCPCS | Performed by: SURGERY

## 2019-01-04 PROCEDURE — 77030020782 HC GWN BAIR PAWS FLX 3M -B

## 2019-01-04 PROCEDURE — 74011000250 HC RX REV CODE- 250

## 2019-01-04 PROCEDURE — 77030035048 HC TRCR ENDOSC OPTCL COVD -B: Performed by: SURGERY

## 2019-01-04 DEVICE — HYDROPHILIC ANATOMICAL MESH,RIGHT SIDE, POLYESTER
Type: IMPLANTABLE DEVICE | Site: ABDOMEN | Status: FUNCTIONAL
Brand: PARIETEX

## 2019-01-04 DEVICE — HYDROPHILIC ANATOMICAL MESH,LEFT SIDE, POLYESTER
Type: IMPLANTABLE DEVICE | Site: ABDOMEN | Status: FUNCTIONAL
Brand: PARIETEX

## 2019-01-04 RX ORDER — OXYCODONE AND ACETAMINOPHEN 5; 325 MG/1; MG/1
1 TABLET ORAL
Qty: 15 TAB | Refills: 0 | Status: SHIPPED | OUTPATIENT
Start: 2019-01-04 | End: 2021-04-12

## 2019-01-04 RX ORDER — ROCURONIUM BROMIDE 10 MG/ML
INJECTION, SOLUTION INTRAVENOUS AS NEEDED
Status: DISCONTINUED | OUTPATIENT
Start: 2019-01-04 | End: 2019-01-04 | Stop reason: HOSPADM

## 2019-01-04 RX ORDER — HYDROMORPHONE HYDROCHLORIDE 2 MG/ML
INJECTION, SOLUTION INTRAMUSCULAR; INTRAVENOUS; SUBCUTANEOUS AS NEEDED
Status: DISCONTINUED | OUTPATIENT
Start: 2019-01-04 | End: 2019-01-04 | Stop reason: HOSPADM

## 2019-01-04 RX ORDER — ONDANSETRON 2 MG/ML
INJECTION INTRAMUSCULAR; INTRAVENOUS AS NEEDED
Status: DISCONTINUED | OUTPATIENT
Start: 2019-01-04 | End: 2019-01-04 | Stop reason: HOSPADM

## 2019-01-04 RX ORDER — KETOROLAC TROMETHAMINE 30 MG/ML
INJECTION, SOLUTION INTRAMUSCULAR; INTRAVENOUS AS NEEDED
Status: DISCONTINUED | OUTPATIENT
Start: 2019-01-04 | End: 2019-01-04 | Stop reason: HOSPADM

## 2019-01-04 RX ORDER — LIDOCAINE HYDROCHLORIDE 10 MG/ML
0.1 INJECTION, SOLUTION EPIDURAL; INFILTRATION; INTRACAUDAL; PERINEURAL AS NEEDED
Status: DISCONTINUED | OUTPATIENT
Start: 2019-01-04 | End: 2019-01-04 | Stop reason: HOSPADM

## 2019-01-04 RX ORDER — SODIUM CHLORIDE, SODIUM LACTATE, POTASSIUM CHLORIDE, CALCIUM CHLORIDE 600; 310; 30; 20 MG/100ML; MG/100ML; MG/100ML; MG/100ML
25 INJECTION, SOLUTION INTRAVENOUS CONTINUOUS
Status: DISCONTINUED | OUTPATIENT
Start: 2019-01-04 | End: 2019-01-04 | Stop reason: HOSPADM

## 2019-01-04 RX ORDER — GLYCOPYRROLATE 0.2 MG/ML
INJECTION INTRAMUSCULAR; INTRAVENOUS AS NEEDED
Status: DISCONTINUED | OUTPATIENT
Start: 2019-01-04 | End: 2019-01-04 | Stop reason: HOSPADM

## 2019-01-04 RX ORDER — FENTANYL CITRATE 50 UG/ML
INJECTION, SOLUTION INTRAMUSCULAR; INTRAVENOUS AS NEEDED
Status: DISCONTINUED | OUTPATIENT
Start: 2019-01-04 | End: 2019-01-04 | Stop reason: HOSPADM

## 2019-01-04 RX ORDER — SODIUM CHLORIDE, SODIUM LACTATE, POTASSIUM CHLORIDE, CALCIUM CHLORIDE 600; 310; 30; 20 MG/100ML; MG/100ML; MG/100ML; MG/100ML
100 INJECTION, SOLUTION INTRAVENOUS CONTINUOUS
Status: DISCONTINUED | OUTPATIENT
Start: 2019-01-04 | End: 2019-01-04 | Stop reason: HOSPADM

## 2019-01-04 RX ORDER — CEFAZOLIN SODIUM/WATER 2 G/20 ML
2 SYRINGE (ML) INTRAVENOUS
Status: COMPLETED | OUTPATIENT
Start: 2019-01-04 | End: 2019-01-04

## 2019-01-04 RX ORDER — NEOSTIGMINE METHYLSULFATE 1 MG/ML
INJECTION INTRAVENOUS AS NEEDED
Status: DISCONTINUED | OUTPATIENT
Start: 2019-01-04 | End: 2019-01-04 | Stop reason: HOSPADM

## 2019-01-04 RX ORDER — MIDAZOLAM HYDROCHLORIDE 1 MG/ML
INJECTION, SOLUTION INTRAMUSCULAR; INTRAVENOUS AS NEEDED
Status: DISCONTINUED | OUTPATIENT
Start: 2019-01-04 | End: 2019-01-04 | Stop reason: HOSPADM

## 2019-01-04 RX ORDER — LIDOCAINE HYDROCHLORIDE 20 MG/ML
INJECTION, SOLUTION EPIDURAL; INFILTRATION; INTRACAUDAL; PERINEURAL AS NEEDED
Status: DISCONTINUED | OUTPATIENT
Start: 2019-01-04 | End: 2019-01-04 | Stop reason: HOSPADM

## 2019-01-04 RX ORDER — ONDANSETRON 4 MG/1
4 TABLET, ORALLY DISINTEGRATING ORAL
Qty: 20 TAB | Refills: 0 | Status: SHIPPED | OUTPATIENT
Start: 2019-01-04 | End: 2021-04-12

## 2019-01-04 RX ORDER — HYDROMORPHONE HYDROCHLORIDE 1 MG/ML
.25-1 INJECTION, SOLUTION INTRAMUSCULAR; INTRAVENOUS; SUBCUTANEOUS
Status: DISCONTINUED | OUTPATIENT
Start: 2019-01-04 | End: 2019-01-04 | Stop reason: HOSPADM

## 2019-01-04 RX ORDER — DEXAMETHASONE SODIUM PHOSPHATE 4 MG/ML
INJECTION, SOLUTION INTRA-ARTICULAR; INTRALESIONAL; INTRAMUSCULAR; INTRAVENOUS; SOFT TISSUE AS NEEDED
Status: DISCONTINUED | OUTPATIENT
Start: 2019-01-04 | End: 2019-01-04 | Stop reason: HOSPADM

## 2019-01-04 RX ORDER — PROPOFOL 10 MG/ML
INJECTION, EMULSION INTRAVENOUS AS NEEDED
Status: DISCONTINUED | OUTPATIENT
Start: 2019-01-04 | End: 2019-01-04 | Stop reason: HOSPADM

## 2019-01-04 RX ADMIN — SODIUM CHLORIDE, SODIUM LACTATE, POTASSIUM CHLORIDE, AND CALCIUM CHLORIDE 100 ML/HR: 600; 310; 30; 20 INJECTION, SOLUTION INTRAVENOUS at 06:43

## 2019-01-04 RX ADMIN — GLYCOPYRROLATE 0.4 MG: 0.2 INJECTION INTRAMUSCULAR; INTRAVENOUS at 09:13

## 2019-01-04 RX ADMIN — PROPOFOL 150 MG: 10 INJECTION, EMULSION INTRAVENOUS at 07:31

## 2019-01-04 RX ADMIN — DEXAMETHASONE SODIUM PHOSPHATE 12 MG: 4 INJECTION, SOLUTION INTRA-ARTICULAR; INTRALESIONAL; INTRAMUSCULAR; INTRAVENOUS; SOFT TISSUE at 07:38

## 2019-01-04 RX ADMIN — HYDROMORPHONE HYDROCHLORIDE 1 MG: 2 INJECTION, SOLUTION INTRAMUSCULAR; INTRAVENOUS; SUBCUTANEOUS at 08:44

## 2019-01-04 RX ADMIN — SODIUM CHLORIDE, SODIUM LACTATE, POTASSIUM CHLORIDE, AND CALCIUM CHLORIDE 25 ML/HR: 600; 310; 30; 20 INJECTION, SOLUTION INTRAVENOUS at 06:45

## 2019-01-04 RX ADMIN — ROCURONIUM BROMIDE 30 MG: 10 INJECTION, SOLUTION INTRAVENOUS at 07:31

## 2019-01-04 RX ADMIN — DEXAMETHASONE SODIUM PHOSPHATE 12 MG: 4 INJECTION, SOLUTION INTRA-ARTICULAR; INTRALESIONAL; INTRAMUSCULAR; INTRAVENOUS; SOFT TISSUE at 07:34

## 2019-01-04 RX ADMIN — ROCURONIUM BROMIDE 5 MG: 10 INJECTION, SOLUTION INTRAVENOUS at 08:12

## 2019-01-04 RX ADMIN — FENTANYL CITRATE 250 MCG: 50 INJECTION, SOLUTION INTRAMUSCULAR; INTRAVENOUS at 07:31

## 2019-01-04 RX ADMIN — MIDAZOLAM HYDROCHLORIDE 2 MG: 1 INJECTION, SOLUTION INTRAMUSCULAR; INTRAVENOUS at 07:28

## 2019-01-04 RX ADMIN — ONDANSETRON 4 MG: 2 INJECTION INTRAMUSCULAR; INTRAVENOUS at 08:10

## 2019-01-04 RX ADMIN — Medication 2 G: at 07:36

## 2019-01-04 RX ADMIN — LIDOCAINE HYDROCHLORIDE 100 MG: 20 INJECTION, SOLUTION EPIDURAL; INFILTRATION; INTRACAUDAL; PERINEURAL at 07:31

## 2019-01-04 RX ADMIN — ROCURONIUM BROMIDE 10 MG: 10 INJECTION, SOLUTION INTRAVENOUS at 08:21

## 2019-01-04 RX ADMIN — SODIUM CHLORIDE, SODIUM LACTATE, POTASSIUM CHLORIDE, AND CALCIUM CHLORIDE: 600; 310; 30; 20 INJECTION, SOLUTION INTRAVENOUS at 07:28

## 2019-01-04 RX ADMIN — HYDROMORPHONE HYDROCHLORIDE 1 MG: 2 INJECTION, SOLUTION INTRAMUSCULAR; INTRAVENOUS; SUBCUTANEOUS at 08:25

## 2019-01-04 RX ADMIN — NEOSTIGMINE METHYLSULFATE 3 MG: 1 INJECTION INTRAVENOUS at 09:13

## 2019-01-04 RX ADMIN — KETOROLAC TROMETHAMINE 30 MG: 30 INJECTION, SOLUTION INTRAMUSCULAR; INTRAVENOUS at 07:50

## 2019-01-04 NOTE — ANESTHESIA PREPROCEDURE EVALUATION
Anesthetic History No history of anesthetic complications Review of Systems / Medical History Patient summary reviewed, nursing notes reviewed and pertinent labs reviewed Pulmonary Within defined limits Neuro/Psych Within defined limits Cardiovascular Pertinent negatives: Hypertension: no current rx. Exercise tolerance: <4 METS 
  
GI/Hepatic/Renal 
Within defined limits Endo/Other Within defined limits Other Findings Physical Exam 
 
Airway Mallampati: II 
TM Distance: 4 - 6 cm Neck ROM: normal range of motion Mouth opening: Normal 
 
 Cardiovascular Regular rate and rhythm,  S1 and S2 normal,  no murmur, click, rub, or gallop Rhythm: regular Rate: normal 
 
 
 
 Dental 
 
Dentition: Implants Pulmonary Breath sounds clear to auscultation Abdominal 
 
 
 
Comments: RIH Other Findings Anesthetic Plan ASA: 2 Anesthesia type: general 
 
 
 
 
Induction: Intravenous Anesthetic plan and risks discussed with: Patient

## 2019-01-04 NOTE — INTERVAL H&P NOTE
H&P Update:  Jassi Stanton was seen and examined. History and physical has been reviewed. The patient has been examined.  There have been no significant clinical changes since the completion of the originally dated History and Physical.    Signed By: Melody Girard MD     January 4, 2019 6:59 AM

## 2019-01-04 NOTE — OP NOTES
[address] REPORT    Lina Hatchet  MR#: [id number]  : 1965  ACCOUNT #: [de-identified]   DATE OF SERVICE: 2019    PREOPERATIVE DIAGNOSIS:  Symptomatic right inguinal hernia. POSTOPERATIVE DIAGNOSIS:  Bilateral inguinal hernias. PROCEDURE PERFORMED:    1.  Robotic-assisted laparoscopic bilateral inguinal hernia repair with mesh. 2.  Bilateral ilioinguinal nerve block. ANESTHESIA:  General endotracheal.    ESTIMATED BLOOD LOSS:  Minimal.    TUBES AND DRAINS:  None. SPECIMENS REMOVED:  None. IMPLANTS:  Parietex anatomic mesh 4 x 6 inches left-hand side and right-hand side. SURGEON:  Anastasiia Donaldson MD    ASSISTANT:  Loren Jo    INDICATIONS FOR PROCEDURE:  The patient is a 82-year-old male who has developed a symptomatic right inguinal hernia and he presents for repair. FINDINGS:    1. Right-sided indirect inguinal hernia. 2.  Bilateral femoral hernias. COMPLICATIONS:  None apparent. DESCRIPTION OF PROCEDURE:  The patient was identified in the preoperative holding area. Informed consent obtained. He was given preoperative antibiotics. He was then taken to the operating room and placed in the supine position, underwent general endotracheal anesthesia without complication. His arms were then tucked and all pressure points padded. His abdomen was then prepped and draped in the usual sterile fashion. A timeout was performed confirmed that the patient was the patient and that he was presenting for robotic-assisted laparoscopic right inguinal hernia repair, possible left. Once this was confirmed, entry into the abdomen was obtained at the tip of the 11th rib on the left-hand side. At this site, 3 mL of the Exparel were injected in subcutaneous space.   An 8 mm incision made and an 8 mm robotic trocar containing a 5 mm 0-degree laparoscope was used to dissect through layers of the abdominal wall under direct laparoscopic vision. Entry into the abdomen was then confirmed visually. Once in the abdomen, insufflation was provided through the trocar to a pressure of 15 mmHg. The patient tolerated insufflation well and there were no apparent complications. A  360-degree evaluation of the abdomen was then performed from this trocar site. There were no peritoneal implants. Insertion on the liver appeared normal.  What was visible of the bowel appeared normal.  Examination of the pelvis revealed an indirect right-sided inguinal hernia containing no visceral contents. There was a femoral hernia visible on the right-hand side. Examination of the left-hand side showed no signs of an indirect hernia, but the bladder did overlie the area of the femoral space obscuring view of the left femoral space. Attention was then focused across the upper abdomen and 2 additional 8 mm trocars were placed under direct laparoscopic vision. The patient was then placed in 12 degrees Trendelenburg. I then performed a bilateral ilioinguinal nerve block with the remaining Exparel divided equally on either side. This was done under direct laparoscopic vision. The needle was advanced through the skin into the preperitoneal space and then withdrawn to the subfascial layer where the Exparel was injected. Once this was completed, the robotic bedside cart was docked to the trocars. I broke scrub and went to the console. Using a fenestrated grasper and scissors, I scored the peritoneum transversely across the abdomen approximately 4 cm below the umbilicus. A blunt dissection was then used to create a preperitoneal plane all the way down to below the superior pubic rami. Attention was focused on the right-hand side first.  The right-sided spermatic cord was identified, circumferentially dissected out. The hernia sac was  from the cord structures using a combination of blunt and sharp dissection.   The vas deferens was identified and protected during the dissection. Care was taken to minimize injury to the cremaster muscle fibers. The sac was  off the cord and the peritoneum pulled back up to the level of the umbilicus creating a large preperitoneal plane. The femoral hernia was reduced with blunt dissection. The dissection was then carried over on to the left-hand side. Care was taken to carefully take down the bladder from the abdominal wall on the left-hand side. There was a femoral hernia identified on this side as well. The spermatic cord was identified and circumferentially dissected out. There was no indirect hernia identified on the left-hand side, just the femoral hernia. Once this dissection was completed, 2 pieces of Parietex anatomic mesh with lateral slit were inserted. One oriented to the right-hand side and 1 oriented to the left-hand side. The right-hand side was inserted first, wallpapered over the right myopectineal orifice. Once it was smooth and flat, the door in the mesh was closed around the spermatic cord completely enclosing the cord. A similar piece of mesh was placed on the   left-hand side, the 2 pieces of mesh overlapped in the midline by approximately 5 mm. Next, a 2-0 V-Loc suture was used to suture the 2 pieces of mesh to the periosteum of the pubic tubercle and then up along the linea alba, suturing the 2 pieces of mesh together. Once this was completed, the peritoneum was approximated with a 2-0 V-Loc suture. Once this was completed, insufflation was vented through the 8 mm trocars and they were removed. Skin at all trocar sites closed with 4-0 Monocryl and Dermabond. The patient was extubated in the room, taken to postanesthesia care unit in stable condition. Instruments and sponge counts correct x2.       MD Eduardo Gee / ASHLIE  D: 01/04/2019 11:01     T: 01/04/2019 11:25  JOB #: [id number]

## 2019-01-04 NOTE — DISCHARGE INSTRUCTIONS
Patient Education        Hernia Repair: What to Expect at [address] [place] [address] are likely to have pain for the next few days. You may also feel like you have the flu, and you may have a low fever and feel tired and nauseated. This is common. You should feel better after a few days and will probably feel much better in 7 days. For several weeks you may feel twinges or pulling in the hernia repair when you move. You may have some bruising on the scrotum and along the penis. This is normal. Men will need to wear a jockstrap or briefs, not boxers, for scrotal support for several days after a groin (inguinal) hernia repair. [employer] bicycle shorts may provide good support. This care sheet gives you a general idea about how long it will take for you to recover. But each person recovers at a different pace. Follow the steps below to get better as quickly as possible. How can you care for yourself at home? Activity    · Rest when you feel tired. Getting enough sleep will help you recover.     · Try to walk each day. Start by walking a little more than you did the day before. Bit by bit, increase the amount you walk. Walking boosts blood flow and helps prevent pneumonia and constipation.     · Avoid strenuous activities, such as biking, jogging, weight lifting, or aerobic exercise, until your doctor says it is okay.     · Avoid lifting anything that would make you strain. This may include heavy grocery bags and milk containers, a heavy briefcase or backpack, cat litter or dog food bags, a vacuum , or a child.     · You may drive when you are no longer taking pain medicine and can quickly move your foot from the gas pedal to the brake. You must also be able to sit comfortably for a long period of time, even if you do not plan to go far.  You might get caught in traffic.     · Most people are able to return to work within 1 to 2 weeks after surgery.     · You may shower 24 to 48 hours after surgery, if your doctor okays it. Pat the cut (incision) dry. Do not take a bath for the first 2 weeks, or until your doctor tells you it is okay.     · Your doctor will tell you when you can have sex again. Diet    · You can eat your normal diet. If your stomach is upset, try bland, low-fat foods like plain rice, broiled chicken, toast, and yogurt.     · Drink plenty of fluids (unless your doctor tells you not to).     · You may notice that your bowel movements are not regular right after your surgery. This is common. Avoid constipation and straining with bowel movements. You may want to take a fiber supplement every day. If you have not had a bowel movement after a couple of days, ask your doctor about taking a mild laxative. Medicines    · Your doctor will tell you if and when you can restart your medicines. He or she will also give you instructions about taking any new medicines.     · If you take blood thinners, such as warfarin (Coumadin), clopidogrel (Plavix), or aspirin, be sure to talk to your doctor. He or she will tell you if and when to start taking those medicines again. Make sure that you understand exactly what your doctor wants you to do.     · Be safe with medicines. Take pain medicines exactly as directed. ? If the doctor gave you a prescription medicine for pain, take it as prescribed. ? If you are not taking a prescription pain medicine, take an over-the-counter medicine such as acetaminophen (Tylenol), ibuprofen (Advil, Motrin), or naproxen (Aleve). Read and follow all instructions on the label. ? Do not take two or more pain medicines at the same time unless the doctor told you to. Many pain medicines have acetaminophen, which is Tylenol. Too much acetaminophen (Tylenol) can be harmful.     · If your doctor prescribed antibiotics, take them as directed. Do not stop taking them just because you feel better.  You need to take the full course of antibiotics.     · If you think your pain medicine is making you sick to your stomach:  ? Take your medicine after meals (unless your doctor has told you not to). ? Ask your doctor for a different pain medicine. Incision care    · If you have strips of tape on the cut (incision) the doctor made, leave the tape on for a week or until it falls off.     · If you have staples closing the cut, you will need to visit your doctor in 1 to 2 weeks to have them removed.     · Wash the area daily with warm, soapy water and pat it dry. Follow-up care is a key part of your treatment and safety. Be sure to make and go to all appointments, and call your doctor if you are having problems. It's also a good idea to know your test results and keep a list of the medicines you take. When should you call for help? Call 911 anytime you think you may need emergency care. For example, call if:    · You passed out (lost consciousness).     · You are short of breath.    Call your doctor now or seek immediate medical care if:    · You have pain that does not get better after you take pain medicine.     · You are sick to your stomach and cannot keep fluids down.     · You have signs of a blood clot in your leg (called a deep vein thrombosis), such as:  ? Pain in your calf, back of the knee, thigh, or groin. ? Redness and swelling in your leg or groin.     · You cannot pass stools or gas.     · Bright red blood has soaked through the bandage over your incision.     · You have loose stitches, or your incision comes open.     · You have signs of infection, such as:  ? Increased pain, swelling, warmth, or redness. ? Red streaks leading from the incision. ? Pus draining from the incision. ? A fever.    Watch closely for any changes in your health, and be sure to contact your doctor if you have any problems. Where can you learn more? Go to http://noa-zaira.info/. Enter A372 in the search box to learn more about \"Hernia Repair: What to Expect at Home. \"  Current as of: March 28, 2018  Content Version: 11.8  © 2006-2018 [employer]. Care instructions adapted under license by [employer] (which disclaims liability or warranty for this information). If you have questions about a medical condition or this instruction, always ask your healthcare professional. [address] any warranty or liability for your use of this information. DISCHARGE SUMMARY from your Nurse    The following personal items collected during your admission are returned to you:   Dental Appliance: Dental Appliances: None  Vision:    Hearing Aid:    Jewelry: Jewelry: None  Clothing: Clothing: Other (comment)(clothes to locker)  Other Valuables: Other Valuables: None  Valuables sent to safe:      PATIENT INSTRUCTIONS:    After general anesthesia or intravenous sedation, for 24 hours or while taking prescription Narcotics:  · Limit your activities  · Do not drive and operate hazardous machinery  · Do not make important personal or business decisions  · Do  not drink alcoholic beverages  · If you have not urinated within 8 hours after discharge, please contact your surgeon on call. Report the following to your surgeon:  · Excessive pain, swelling, redness or odor of or around the surgical area  · Temperature over 100.5  · Nausea and vomiting lasting longer than 4 hours or if unable to take medications  · Any signs of decreased circulation or nerve impairment to extremity: change in color, persistent  numbness, tingling, coldness or increase pain  · Any questions    COUGH AND DEEP BREATHE    Breathing deep and coughing are very important exercises to do after surgery. Deep breathing and coughing open the little air tubes and air sacks in your lungs. You take deep breaths every day. You may not even notice - it is just something you do when you sigh or yawn. It is a natural exercise you do to keep these air passages open.      After surgery, take deep breaths and cough, on purpose. Coughing and deep breathing help prevent bronchitis and pneumonia after surgery. If you had chest or belly surgery, use a pillow as a \"hug florence\" and hold it tightly to your chest or belly when you cough. DIRECTIONS:  6. Take 10 to 15 slow deep breaths every hour while awake. 7. Breathe in deeply, and hold it for 2 seconds. 8. Exhale slowly through puckered lips, like blowing up a balloon. 9. After every 4th or 5th deep breath, hug your pillow to your chest or belly and give a hard, deep cough. Yes, it will probably hurt. But doing this exercise is very important part of healing after surgery. Take your pain medicine to help you do this exercise without too much pain. IF YOU HAVE BEEN DIAGNOSED WITH SLEEP APNEA, PLEASE USE YOUR SLEEP APNEA DEVICE OR CPAP MACHINE WHEN YOU INTEND TO NAP AFTER TAKING PAIN MEDICATION. Ankle Pumps    Ankle pumps increase the circulation of oxygenated blood to your lower extremities and decrease your risk for circulation problems such as blood clots. They also stretch the muscles, tendons and ligaments in your foot and ankle, and prevent joint contracture in the ankle and foot, especially after surgeries on the legs. It is important to do ankle pump exercises regularly after surgery because immobility increases your risk for developing a blood clot. Your doctor may also have you take an Aspirin for the next few days as well. If your doctor did not ask you to take an Aspirin, consult with him before starting Aspirin therapy on your own. Slowly point your foot forward, feeling the muscles on the top of your lower leg stretch, and hold this position for 5 seconds. Next, pull your foot back toward you as far as possible, stretching the calf muscles, and hold that position for 5 seconds. Repeat with the other foot.   Perform 10 repetitions every hour while awake for both ankles if possible (down and then up with the foot once is one repetition). You should feel gentle stretching of the muscles in your lower leg when doing this exercise. If you feel pain, or your range of motion is limited, don't  Push too hard. Only go the limit your joint and muscles will let you go. If you have increasing pain, progressively worsening leg warmth or swelling, STOP the exercise and call your doctor. Below is information about the medications your doctor is prescribing after your visit:    Other information in your discharge envelope:  []     PRESCRIPTIONS  []     PHYSICAL THERAPY PRESCRIPTION  []     APPOINTMENT CARDS  []     Regional Anesthesia Pamphlet for block or block with On-Q Catheter from Anesthesia Service  []     Medical device information sheets/pamphlets from their    []     School/work excuse note. []     /parent work excuse note. These are general instructions for a healthy lifestyle:    *  Please give a list of your current medications to your Primary Care Provider. *  Please update this list whenever your medications are discontinued, doses are      changed, or new medications (including over-the-counter products) are added. *  Please carry medication information at all times in case of emergency situations. About Smoking  No smoking / No tobacco products / Avoid exposure to second hand smoke    Surgeon General's Warning:  Quitting smoking now greatly reduces serious risk to your health. Obesity, smoking, and sedentary lifestyle greatly increases your risk for illness and disease. A healthy diet, regular physical exercise & weight monitoring are important for maintaining a healthy lifestyle.     Congestive Heart Failure  You may be retaining fluid if you have a history of heart failure or if you experience any of the following symptoms:  Weight gain of 3 pounds or more overnight or 5 pounds in a week, increased swelling in our hands or feet or shortness of breath while lying flat in bed.  Please call your doctor as soon as you notice any of these symptoms; do not wait until your next office visit. Recognize signs and symptoms of STROKE:  F - face looks uneven  A - arms unable to move or move even  S - speech slurred or non-existent  T - time-call 911 as soon as signs and symptoms begin-DO NOT go         Back to bed or wait to see if you get better-TIME IS BRAIN. Warning signs of HEART ATTACK  Call 911 if you have these symptoms    · Chest discomfort. Most heart attacks involve discomfort in the center of the chest that lasts more than a few minutes, or that goes away and comes back. It can feel like uncomfortable pressure, squeezing, fullness, or pain. · Discomfort in other areas of the upper body. Symptoms can include pain or discomfort in one or both        Arms, the back, neck, jaw, or stomach. ·  Shortness of breath with or without chest discomfort. · Other signs may include breaking out in a cold sweat, nausea, or lightheadedness    Don't wait more than five minutes to call 911 - MINUTES MATTER! Fast action can save your life. Calling 911 is almost always the fastest way to get lifesaving treatment. Emergency Medical Services staff can begin treatment when they arrive - up to an hour sooner than if someone gets to the hospital by car. [facility] MEDICATION AND SIDE EFFECT GUIDE    The [employer] MEDICATION AND SIDE EFFECT GUIDE was provided to the PATIENT AND CARE PROVIDER.   Information provided includes instruction about drug purpose and common side effects for the following medications:    · Percocet

## 2019-03-04 ENCOUNTER — OFFICE VISIT (OUTPATIENT)
Dept: FAMILY MEDICINE CLINIC | Age: 54
End: 2019-03-04

## 2019-03-04 VITALS — DIASTOLIC BLOOD PRESSURE: 96 MMHG | HEART RATE: 80 BPM | SYSTOLIC BLOOD PRESSURE: 130 MMHG | TEMPERATURE: 100.4 F

## 2019-03-04 DIAGNOSIS — J11.1 INFLUENZA: Primary | ICD-10-CM

## 2019-03-04 RX ORDER — OSELTAMIVIR PHOSPHATE 75 MG/1
75 CAPSULE ORAL 2 TIMES DAILY
Qty: 10 CAP | Refills: 0 | Status: SHIPPED | OUTPATIENT
Start: 2019-03-04 | End: 2019-03-09

## 2019-03-13 DIAGNOSIS — J40 BRONCHITIS: Primary | ICD-10-CM

## 2019-03-13 RX ORDER — AMOXICILLIN AND CLAVULANATE POTASSIUM 875; 125 MG/1; MG/1
1 TABLET, FILM COATED ORAL 2 TIMES DAILY
Qty: 14 TAB | Refills: 0 | Status: SHIPPED | OUTPATIENT
Start: 2019-03-13 | End: 2019-03-20

## 2019-03-25 NOTE — PROGRESS NOTES
HISTORY OF PRESENT ILLNESS  Tez Hinton is a 48 y.o. male. HPI   48year old gentleman who presents c/o \"flu like symptoms\"  He has had subjective fever, myalgia, cough and has been exposed to the flu    Review of Systems   Constitutional: Positive for chills and fever. Respiratory: Negative for cough. Musculoskeletal: Positive for myalgias. Physical Exam   Constitutional: He appears well-developed and well-nourished. HENT:   Right Ear: External ear normal.   Left Ear: External ear normal.   Nose: Nose normal.   Mouth/Throat: Oropharynx is clear and moist. No oropharyngeal exudate. Cardiovascular: Normal rate, regular rhythm and normal heart sounds. No murmur heard. Pulmonary/Chest: Effort normal and breath sounds normal. No respiratory distress. He has no wheezes. He has no rales.        ASSESSMENT and PLAN  Influenza- flu screen negative , but will treat for influeza, because fo flu ativity in the area

## 2019-08-12 ENCOUNTER — HOSPITAL ENCOUNTER (OUTPATIENT)
Dept: GENERAL RADIOLOGY | Age: 54
Discharge: HOME | End: 2019-08-12
Payer: COMMERCIAL

## 2019-08-12 DIAGNOSIS — R26.89 UNABLE TO MAINTAIN WEIGHT-BEARING: ICD-10-CM

## 2019-08-12 PROCEDURE — 73610 X-RAY EXAM OF ANKLE: CPT

## 2019-08-19 ENCOUNTER — HOSPITAL ENCOUNTER (OUTPATIENT)
Dept: GENERAL RADIOLOGY | Age: 54
Discharge: HOME | End: 2019-08-19
Payer: COMMERCIAL

## 2019-08-19 DIAGNOSIS — S82.892D CLOSED FRACTURE OF LEFT ANKLE WITH ROUTINE HEALING, SUBSEQUENT ENCOUNTER: ICD-10-CM

## 2019-08-19 DIAGNOSIS — S82.892D CLOSED FRACTURE OF LEFT ANKLE WITH ROUTINE HEALING, SUBSEQUENT ENCOUNTER: Primary | ICD-10-CM

## 2019-08-19 PROCEDURE — 73610 X-RAY EXAM OF ANKLE: CPT

## 2021-03-05 ENCOUNTER — TRANSCRIBE ORDER (OUTPATIENT)
Dept: SCHEDULING | Age: 56
End: 2021-03-05

## 2021-03-05 DIAGNOSIS — M43.16 SPONDYLOLISTHESIS OF LUMBAR REGION: Primary | ICD-10-CM

## 2021-03-08 ENCOUNTER — HOSPITAL ENCOUNTER (OUTPATIENT)
Dept: MRI IMAGING | Age: 56
Discharge: HOME | End: 2021-03-08
Attending: NEUROLOGICAL SURGERY
Payer: COMMERCIAL

## 2021-03-08 DIAGNOSIS — M43.16 SPONDYLOLISTHESIS OF LUMBAR REGION: ICD-10-CM

## 2021-03-08 PROCEDURE — 72148 MRI LUMBAR SPINE W/O DYE: CPT

## 2021-03-12 ENCOUNTER — TRANSCRIBE ORDER (OUTPATIENT)
Dept: SCHEDULING | Age: 56
End: 2021-03-12

## 2021-03-12 DIAGNOSIS — M43.16 SPONDYLOLISTHESIS, LUMBAR REGION: Primary | ICD-10-CM

## 2021-03-12 DIAGNOSIS — M54.16 LUMBAR RADICULOPATHY: ICD-10-CM

## 2021-03-16 ENCOUNTER — TRANSCRIBE ORDER (OUTPATIENT)
Dept: SCHEDULING | Age: 56
End: 2021-03-16

## 2021-03-16 DIAGNOSIS — M79.89 MASS OF SOFT TISSUE OF KNEE: Primary | ICD-10-CM

## 2021-03-18 ENCOUNTER — HOSPITAL ENCOUNTER (OUTPATIENT)
Dept: MRI IMAGING | Age: 56
Discharge: HOME | End: 2021-03-18
Attending: ORTHOPAEDIC SURGERY
Payer: COMMERCIAL

## 2021-03-18 DIAGNOSIS — M79.89 MASS OF SOFT TISSUE OF KNEE: ICD-10-CM

## 2021-03-18 PROCEDURE — 73721 MRI JNT OF LWR EXTRE W/O DYE: CPT

## 2021-04-12 ENCOUNTER — HOSPITAL ENCOUNTER (OUTPATIENT)
Dept: PREADMISSION TESTING | Age: 56
Discharge: HOME | End: 2021-04-12
Payer: COMMERCIAL

## 2021-04-12 ENCOUNTER — TRANSCRIBE ORDER (OUTPATIENT)
Dept: REGISTRATION | Age: 56
End: 2021-04-12

## 2021-04-12 VITALS
HEART RATE: 64 BPM | SYSTOLIC BLOOD PRESSURE: 126 MMHG | WEIGHT: 154.98 LBS | DIASTOLIC BLOOD PRESSURE: 83 MMHG | HEIGHT: 70 IN | BODY MASS INDEX: 22.19 KG/M2 | TEMPERATURE: 98.2 F | RESPIRATION RATE: 16 BRPM

## 2021-04-12 DIAGNOSIS — Z01.812 PRE-PROCEDURE LAB EXAM: ICD-10-CM

## 2021-04-12 DIAGNOSIS — Z01.812 PRE-PROCEDURE LAB EXAM: Primary | ICD-10-CM

## 2021-04-12 LAB — HGB BLD-MCNC: 13.7 G/DL (ref 12.1–17)

## 2021-04-12 PROCEDURE — 85018 HEMOGLOBIN: CPT

## 2021-04-12 PROCEDURE — 36415 COLL VENOUS BLD VENIPUNCTURE: CPT

## 2021-04-12 PROCEDURE — U0003 INFECTIOUS AGENT DETECTION BY NUCLEIC ACID (DNA OR RNA); SEVERE ACUTE RESPIRATORY SYNDROME CORONAVIRUS 2 (SARS-COV-2) (CORONAVIRUS DISEASE [COVID-19]), AMPLIFIED PROBE TECHNIQUE, MAKING USE OF HIGH THROUGHPUT TECHNOLOGIES AS DESCRIBED BY CMS-2020-01-R: HCPCS

## 2021-04-12 PROCEDURE — 93005 ELECTROCARDIOGRAM TRACING: CPT

## 2021-04-12 NOTE — PERIOP NOTES
DO NOT EAT ANYTHING AFTER MIDNIGHT, except as instructed THE NIGHT BEFORE SURGERY. PT GIVEN OPPORTUNITY TO ASK ADDITIONAL QUESTIONS. Patient given two bottles CHG soap and instruction sheet, instructions for use reviewed with patient. Patient given surgical site infection information FAQs handout and hand hygiene tips sheet. Pre-operative instructions reviewed and patient verbalizes understanding of instructions. Patient has been given the opportunity to ask additional questions. [facility] APPOINTMENTS REVIEWED AND GIVEN TO PATIENT. COVID-19 INSTRUCTION SHEET ALSO REVIEWED AND GIVEN TO PATIENT.

## 2021-04-13 LAB
ATRIAL RATE: 68 BPM
CALCULATED P AXIS, ECG09: 67 DEGREES
CALCULATED R AXIS, ECG10: 64 DEGREES
CALCULATED T AXIS, ECG11: 35 DEGREES
DIAGNOSIS, 93000: NORMAL
P-R INTERVAL, ECG05: 202 MS
Q-T INTERVAL, ECG07: 386 MS
QRS DURATION, ECG06: 92 MS
QTC CALCULATION (BEZET), ECG08: 410 MS
SARS-COV-2, COV2NT: NOT DETECTED
VENTRICULAR RATE, ECG03: 68 BPM

## 2021-04-13 NOTE — PERIOP NOTES
MED SERVICES CALLED. SPOKE W/ MS  INFORMING HER THAT PT'S EKG STATES \"ACTIVE\". SHE STATES SHE WILL CALL CARDIOLOGY TO HAVE IT READ TODAY.

## 2021-04-15 ENCOUNTER — ANESTHESIA EVENT (OUTPATIENT)
Dept: SURGERY | Age: 56
End: 2021-04-15
Payer: COMMERCIAL

## 2021-04-15 NOTE — ANESTHESIA PREPROCEDURE EVALUATION
Relevant Problems   No relevant active problems       Anesthetic History   No history of anesthetic complications            Review of Systems / Medical History  Patient summary reviewed, nursing notes reviewed and pertinent labs reviewed    Pulmonary  Within defined limits                 Neuro/Psych   Within defined limits           Cardiovascular    Hypertension                   GI/Hepatic/Renal  Within defined limits              Endo/Other  Within defined limits           Other Findings              Physical Exam    Airway  Mallampati: II  TM Distance: > 6 cm  Neck ROM: normal range of motion   Mouth opening: Normal     Cardiovascular  Regular rate and rhythm,  S1 and S2 normal,  no murmur, click, rub, or gallop             Dental  No notable dental hx       Pulmonary  Breath sounds clear to auscultation               Abdominal  GI exam deferred       Other Findings            Anesthetic Plan    ASA: 1  Anesthesia type: general          Induction: Intravenous  Anesthetic plan and risks discussed with: Patient

## 2021-04-15 NOTE — H&P
Mari[place] Mon - 03/16/2021 1:40 PM EDT  Formatting of this note is different from the original.  PCP: Dulce Maria Villa MD  There is no problem list on file for this patient. 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 He complains of right knee pain in the office that has been ongoing for 3 weeks. He denies that the pain began due to an injury. He localizes the pain to the lateral joint line. He reports the pain keeps him awake at night. He notes the pain is worse with going down the stairs. He states he has tried various different antiinflammatories for the pain. He is ambulating without assistance today. Of note, he denies a history of gout. Objective: There were no vitals filed for this visit. Height: 5' 9.5\"   Weight: 158 lb   Body mass index is 23 kg/m². Constitutional: No acute distress. Well nourished. Well developed. Eyes: Sclera are nonicteric. Respiratory: No labored breathing. Cardiovascular: No marked edema. Skin: No marked skin ulcers. Neurological: No marked sensory loss noted. Psychiatric: Alert and oriented x3. Musculoskeletal : Right Knee: He has full range of the bilateral hips with no pain on motion. He has a 5 degree flexure contracture of the right knee with no significant pain on motion. Ligaments intact. Moderate effusion. Negative patellofemoral grind. Positive Howard's sign laterally. Palpable meniscal cyst laterally. Strong pedal pulses. No pedal edema. Skin healthy and intact. No apparent atrophy. Leg lengths appear equal.    Radiographs:   Order: XR KNEE 3 VW RIGHT - Indication: Mass of soft tissue of knee      X-ray knee right 3 views ([id number])    Result Date: 3/16/2021  Views: Standing AP, Lat, [place].      Impression: 3-view x-ray of the right knee shows diffuse calcification in the soft tissue on the lateral aspect of the knee.    Assessment:     1. Mass of soft tissue of knee     Plan:     I reviewed the x-ray of the right knee ordered today with the patient. The x-ray shows diffuse calcification in the soft tissue on the lateral aspect of the knee. I am concerned of a calcified soft tissue mass. I also discussed treatment options including a steroid injection to the right knee vs an MRI of the right knee and the pros and cons of each. I recommend an MRI of the right knee to evaluate for a soft tissue mass. The patient states his understanding and wishes to proceed. MRI referral provided today. Follow-up for these results. Orders Placed This Encounter    X-ray knee right 3 views ([id number])    MRI knee right without contrast ([id number])     Return for MRI Results. Addendum:      Mri reveals globular synovial nodule /synovitis/ poss localized pvns    Date of Surgery Update:  Jennifer Wing MD was seen and examined. History and physical has been reviewed. The patient has been examined. There have been no significant clinical changes since the completion of the originally dated History and Physical.  Patient identified by surgeon; surgical site was confirmed by patient and surgeon.     Signed By: Todd) Kin Lieberman MD     April 17, 2021 9:28 AM

## 2021-04-16 ENCOUNTER — HOSPITAL ENCOUNTER (OUTPATIENT)
Age: 56
Setting detail: OUTPATIENT SURGERY
Discharge: HOME | End: 2021-04-16
Attending: ORTHOPAEDIC SURGERY | Admitting: ORTHOPAEDIC SURGERY
Payer: COMMERCIAL

## 2021-04-16 ENCOUNTER — ANESTHESIA (OUTPATIENT)
Dept: SURGERY | Age: 56
End: 2021-04-16
Payer: COMMERCIAL

## 2021-04-16 VITALS
HEART RATE: 60 BPM | SYSTOLIC BLOOD PRESSURE: 119 MMHG | TEMPERATURE: 97.5 F | WEIGHT: 154.98 LBS | BODY MASS INDEX: 22.19 KG/M2 | DIASTOLIC BLOOD PRESSURE: 86 MMHG | OXYGEN SATURATION: 95 % | HEIGHT: 70 IN | RESPIRATION RATE: 10 BRPM

## 2021-04-16 DIAGNOSIS — M65.9 SYNOVITIS OF RIGHT KNEE: Primary | ICD-10-CM

## 2021-04-16 PROCEDURE — 74011250636 HC RX REV CODE- 250/636: Performed by: ANESTHESIOLOGY

## 2021-04-16 PROCEDURE — 74011250636 HC RX REV CODE- 250/636: Performed by: NURSE ANESTHETIST, CERTIFIED REGISTERED

## 2021-04-16 PROCEDURE — 77030008753 HC TU IRR IN/OUT FLO S&N -B: Performed by: ORTHOPAEDIC SURGERY

## 2021-04-16 PROCEDURE — 76210000020 HC REC RM PH II FIRST 0.5 HR: Performed by: ORTHOPAEDIC SURGERY

## 2021-04-16 PROCEDURE — 77030002916 HC SUT ETHLN J&J -A: Performed by: ORTHOPAEDIC SURGERY

## 2021-04-16 PROCEDURE — 77030018834: Performed by: ORTHOPAEDIC SURGERY

## 2021-04-16 PROCEDURE — 77030000032 HC CUF TRNQT ZIMM -B: Performed by: ORTHOPAEDIC SURGERY

## 2021-04-16 PROCEDURE — 76010000149 HC OR TIME 1 TO 1.5 HR: Performed by: ORTHOPAEDIC SURGERY

## 2021-04-16 PROCEDURE — 76060000033 HC ANESTHESIA 1 TO 1.5 HR: Performed by: ORTHOPAEDIC SURGERY

## 2021-04-16 PROCEDURE — 74011000250 HC RX REV CODE- 250: Performed by: PHYSICIAN ASSISTANT

## 2021-04-16 PROCEDURE — 74011250637 HC RX REV CODE- 250/637

## 2021-04-16 PROCEDURE — 74011250636 HC RX REV CODE- 250/636: Performed by: PHYSICIAN ASSISTANT

## 2021-04-16 PROCEDURE — 76210000000 HC OR PH I REC 2 TO 2.5 HR: Performed by: ORTHOPAEDIC SURGERY

## 2021-04-16 PROCEDURE — 77030006884 HC BLD SHV INCIS S&N -B: Performed by: ORTHOPAEDIC SURGERY

## 2021-04-16 PROCEDURE — 77030010509 HC AIRWY LMA MSK TELE -A: Performed by: ANESTHESIOLOGY

## 2021-04-16 PROCEDURE — 74011250637 HC RX REV CODE- 250/637: Performed by: ANESTHESIOLOGY

## 2021-04-16 PROCEDURE — 2709999900 HC NON-CHARGEABLE SUPPLY: Performed by: ORTHOPAEDIC SURGERY

## 2021-04-16 PROCEDURE — 74011250636 HC RX REV CODE- 250/636: Performed by: ORTHOPAEDIC SURGERY

## 2021-04-16 PROCEDURE — 74011000250 HC RX REV CODE- 250: Performed by: NURSE ANESTHETIST, CERTIFIED REGISTERED

## 2021-04-16 RX ORDER — ONDANSETRON 2 MG/ML
4 INJECTION INTRAMUSCULAR; INTRAVENOUS AS NEEDED
Status: DISCONTINUED | OUTPATIENT
Start: 2021-04-16 | End: 2021-04-16 | Stop reason: HOSPADM

## 2021-04-16 RX ORDER — LIDOCAINE HYDROCHLORIDE 20 MG/ML
INJECTION, SOLUTION EPIDURAL; INFILTRATION; INTRACAUDAL; PERINEURAL AS NEEDED
Status: DISCONTINUED | OUTPATIENT
Start: 2021-04-16 | End: 2021-04-16 | Stop reason: HOSPADM

## 2021-04-16 RX ORDER — ROPIVACAINE HYDROCHLORIDE 5 MG/ML
150 INJECTION, SOLUTION EPIDURAL; INFILTRATION; PERINEURAL AS NEEDED
Status: DISCONTINUED | OUTPATIENT
Start: 2021-04-16 | End: 2021-04-16 | Stop reason: HOSPADM

## 2021-04-16 RX ORDER — MIDAZOLAM HYDROCHLORIDE 1 MG/ML
1 INJECTION, SOLUTION INTRAMUSCULAR; INTRAVENOUS AS NEEDED
Status: DISCONTINUED | OUTPATIENT
Start: 2021-04-16 | End: 2021-04-16 | Stop reason: HOSPADM

## 2021-04-16 RX ORDER — DEXAMETHASONE SODIUM PHOSPHATE 4 MG/ML
INJECTION, SOLUTION INTRA-ARTICULAR; INTRALESIONAL; INTRAMUSCULAR; INTRAVENOUS; SOFT TISSUE AS NEEDED
Status: DISCONTINUED | OUTPATIENT
Start: 2021-04-16 | End: 2021-04-16 | Stop reason: HOSPADM

## 2021-04-16 RX ORDER — FENTANYL CITRATE 50 UG/ML
INJECTION, SOLUTION INTRAMUSCULAR; INTRAVENOUS AS NEEDED
Status: DISCONTINUED | OUTPATIENT
Start: 2021-04-16 | End: 2021-04-16 | Stop reason: HOSPADM

## 2021-04-16 RX ORDER — EPHEDRINE SULFATE/0.9% NACL/PF 50 MG/5 ML
5 SYRINGE (ML) INTRAVENOUS AS NEEDED
Status: DISCONTINUED | OUTPATIENT
Start: 2021-04-16 | End: 2021-04-16 | Stop reason: HOSPADM

## 2021-04-16 RX ORDER — FENTANYL CITRATE 50 UG/ML
25 INJECTION, SOLUTION INTRAMUSCULAR; INTRAVENOUS
Status: DISCONTINUED | OUTPATIENT
Start: 2021-04-16 | End: 2021-04-16 | Stop reason: HOSPADM

## 2021-04-16 RX ORDER — MIDAZOLAM HYDROCHLORIDE 1 MG/ML
INJECTION, SOLUTION INTRAMUSCULAR; INTRAVENOUS AS NEEDED
Status: DISCONTINUED | OUTPATIENT
Start: 2021-04-16 | End: 2021-04-16 | Stop reason: HOSPADM

## 2021-04-16 RX ORDER — PROPOFOL 10 MG/ML
INJECTION, EMULSION INTRAVENOUS AS NEEDED
Status: DISCONTINUED | OUTPATIENT
Start: 2021-04-16 | End: 2021-04-16 | Stop reason: HOSPADM

## 2021-04-16 RX ORDER — MIDAZOLAM HYDROCHLORIDE 1 MG/ML
0.5 INJECTION, SOLUTION INTRAMUSCULAR; INTRAVENOUS
Status: DISCONTINUED | OUTPATIENT
Start: 2021-04-16 | End: 2021-04-16 | Stop reason: HOSPADM

## 2021-04-16 RX ORDER — LIDOCAINE HYDROCHLORIDE 10 MG/ML
0.1 INJECTION, SOLUTION EPIDURAL; INFILTRATION; INTRACAUDAL; PERINEURAL AS NEEDED
Status: DISCONTINUED | OUTPATIENT
Start: 2021-04-16 | End: 2021-04-16 | Stop reason: HOSPADM

## 2021-04-16 RX ORDER — OXYCODONE HYDROCHLORIDE 5 MG/1
5 TABLET ORAL AS NEEDED
Status: DISCONTINUED | OUTPATIENT
Start: 2021-04-16 | End: 2021-04-16 | Stop reason: HOSPADM

## 2021-04-16 RX ORDER — HYDROCODONE BITARTRATE AND ACETAMINOPHEN 5; 325 MG/1; MG/1
1 TABLET ORAL
Qty: 12 TAB | Refills: 0 | Status: SHIPPED | OUTPATIENT
Start: 2021-04-16 | End: 2021-04-23

## 2021-04-16 RX ORDER — HYDROMORPHONE HYDROCHLORIDE 2 MG/ML
INJECTION, SOLUTION INTRAMUSCULAR; INTRAVENOUS; SUBCUTANEOUS AS NEEDED
Status: DISCONTINUED | OUTPATIENT
Start: 2021-04-16 | End: 2021-04-16 | Stop reason: HOSPADM

## 2021-04-16 RX ORDER — SODIUM CHLORIDE 9 MG/ML
25 INJECTION, SOLUTION INTRAVENOUS CONTINUOUS
Status: DISCONTINUED | OUTPATIENT
Start: 2021-04-16 | End: 2021-04-16 | Stop reason: HOSPADM

## 2021-04-16 RX ORDER — ONDANSETRON 4 MG/1
TABLET, ORALLY DISINTEGRATING ORAL
Status: COMPLETED
Start: 2021-04-16 | End: 2021-04-16

## 2021-04-16 RX ORDER — ONDANSETRON 4 MG/1
4 TABLET, ORALLY DISINTEGRATING ORAL
Qty: 15 TAB | Refills: 0 | Status: SHIPPED | OUTPATIENT
Start: 2021-04-16

## 2021-04-16 RX ORDER — ONDANSETRON 2 MG/ML
INJECTION INTRAMUSCULAR; INTRAVENOUS AS NEEDED
Status: DISCONTINUED | OUTPATIENT
Start: 2021-04-16 | End: 2021-04-16 | Stop reason: HOSPADM

## 2021-04-16 RX ORDER — ACETAMINOPHEN 325 MG/1
650 TABLET ORAL ONCE
Status: COMPLETED | OUTPATIENT
Start: 2021-04-16 | End: 2021-04-16

## 2021-04-16 RX ORDER — SODIUM CHLORIDE, SODIUM LACTATE, POTASSIUM CHLORIDE, CALCIUM CHLORIDE 600; 310; 30; 20 MG/100ML; MG/100ML; MG/100ML; MG/100ML
100 INJECTION, SOLUTION INTRAVENOUS CONTINUOUS
Status: DISCONTINUED | OUTPATIENT
Start: 2021-04-16 | End: 2021-04-16 | Stop reason: HOSPADM

## 2021-04-16 RX ORDER — HYDROMORPHONE HYDROCHLORIDE 1 MG/ML
0.2 INJECTION, SOLUTION INTRAMUSCULAR; INTRAVENOUS; SUBCUTANEOUS
Status: ACTIVE | OUTPATIENT
Start: 2021-04-16 | End: 2021-04-16

## 2021-04-16 RX ORDER — ROPIVACAINE HYDROCHLORIDE 5 MG/ML
INJECTION, SOLUTION EPIDURAL; INFILTRATION; PERINEURAL AS NEEDED
Status: DISCONTINUED | OUTPATIENT
Start: 2021-04-16 | End: 2021-04-16 | Stop reason: HOSPADM

## 2021-04-16 RX ORDER — SODIUM CHLORIDE, SODIUM LACTATE, POTASSIUM CHLORIDE, CALCIUM CHLORIDE 600; 310; 30; 20 MG/100ML; MG/100ML; MG/100ML; MG/100ML
1000 INJECTION, SOLUTION INTRAVENOUS CONTINUOUS
Status: DISCONTINUED | OUTPATIENT
Start: 2021-04-16 | End: 2021-04-16 | Stop reason: HOSPADM

## 2021-04-16 RX ORDER — FENTANYL CITRATE 50 UG/ML
50 INJECTION, SOLUTION INTRAMUSCULAR; INTRAVENOUS AS NEEDED
Status: DISCONTINUED | OUTPATIENT
Start: 2021-04-16 | End: 2021-04-16 | Stop reason: HOSPADM

## 2021-04-16 RX ORDER — ONDANSETRON 4 MG/1
4 TABLET, ORALLY DISINTEGRATING ORAL
Status: DISCONTINUED | OUTPATIENT
Start: 2021-04-16 | End: 2021-04-16 | Stop reason: HOSPADM

## 2021-04-16 RX ORDER — DIPHENHYDRAMINE HYDROCHLORIDE 50 MG/ML
12.5 INJECTION, SOLUTION INTRAMUSCULAR; INTRAVENOUS AS NEEDED
Status: DISCONTINUED | OUTPATIENT
Start: 2021-04-16 | End: 2021-04-16 | Stop reason: HOSPADM

## 2021-04-16 RX ORDER — MORPHINE SULFATE 2 MG/ML
2 INJECTION, SOLUTION INTRAMUSCULAR; INTRAVENOUS
Status: DISCONTINUED | OUTPATIENT
Start: 2021-04-16 | End: 2021-04-16 | Stop reason: HOSPADM

## 2021-04-16 RX ADMIN — HYDROMORPHONE HYDROCHLORIDE 0.6 MG: 2 INJECTION, SOLUTION INTRAMUSCULAR; INTRAVENOUS; SUBCUTANEOUS at 08:51

## 2021-04-16 RX ADMIN — ONDANSETRON 4 MG: 4 TABLET, ORALLY DISINTEGRATING ORAL at 11:41

## 2021-04-16 RX ADMIN — HYDROMORPHONE HYDROCHLORIDE 0.4 MG: 2 INJECTION, SOLUTION INTRAMUSCULAR; INTRAVENOUS; SUBCUTANEOUS at 07:50

## 2021-04-16 RX ADMIN — DEXAMETHASONE SODIUM PHOSPHATE 4 MG: 4 INJECTION, SOLUTION INTRAMUSCULAR; INTRAVENOUS at 07:52

## 2021-04-16 RX ADMIN — WATER 2 G: 1 INJECTION INTRAMUSCULAR; INTRAVENOUS; SUBCUTANEOUS at 07:46

## 2021-04-16 RX ADMIN — SODIUM CHLORIDE, POTASSIUM CHLORIDE, SODIUM LACTATE AND CALCIUM CHLORIDE 1000 ML: 600; 310; 30; 20 INJECTION, SOLUTION INTRAVENOUS at 06:39

## 2021-04-16 RX ADMIN — PROPOFOL 200 MG: 10 INJECTION, EMULSION INTRAVENOUS at 07:40

## 2021-04-16 RX ADMIN — LIDOCAINE HYDROCHLORIDE 60 MG: 20 INJECTION, SOLUTION EPIDURAL; INFILTRATION; INTRACAUDAL; PERINEURAL at 07:40

## 2021-04-16 RX ADMIN — MIDAZOLAM 1 MG: 1 INJECTION INTRAMUSCULAR; INTRAVENOUS at 07:38

## 2021-04-16 RX ADMIN — ONDANSETRON HYDROCHLORIDE 4 MG: 2 INJECTION, SOLUTION INTRAMUSCULAR; INTRAVENOUS at 08:06

## 2021-04-16 RX ADMIN — FENTANYL CITRATE 25 MCG: 50 INJECTION, SOLUTION INTRAMUSCULAR; INTRAVENOUS at 08:51

## 2021-04-16 RX ADMIN — FENTANYL CITRATE 50 MCG: 50 INJECTION, SOLUTION INTRAMUSCULAR; INTRAVENOUS at 08:50

## 2021-04-16 RX ADMIN — MIDAZOLAM 1 MG: 1 INJECTION INTRAMUSCULAR; INTRAVENOUS at 07:29

## 2021-04-16 RX ADMIN — MIDAZOLAM 2 MG: 1 INJECTION INTRAMUSCULAR; INTRAVENOUS at 07:28

## 2021-04-16 RX ADMIN — FENTANYL CITRATE 25 MCG: 50 INJECTION, SOLUTION INTRAMUSCULAR; INTRAVENOUS at 07:54

## 2021-04-16 RX ADMIN — ACETAMINOPHEN 650 MG: 325 TABLET, FILM COATED ORAL at 06:26

## 2021-04-16 NOTE — DISCHARGE INSTRUCTIONS
POST OPERATIVE INSTRUCTIONS  Knee Arthroscopy   MD Ash Willis PA-C          1. First meal at home should be clear liquids. Progress to regular diet as tolerated. 2. Apply an ice bag or use cold therapy device continuously for the first 2-3 days and then apply several times a day for the next 10 days to reduce pain and swelling             After that you can ice as desired. 3. You may remove the bulky dressing  48  hours after surgery and at that time it is ok to shower. You can get the incisions a little wet, but please do not submerge in a tub or pool. Apply band aids over te small incisions and change daily. Re apply ace if there is any drainage. If there are steri-strips , please leave them in place until they fall off.           4. Elevate your surgical leg when sitting or sleeping to reduce swelling. Do not place a pillow directly under the knee, place it under your ankle. It is important to work on getting your knee out straight. 5. Exercises- practice quadricep tightening, straight leg raising exercises and ankle pumps several times a day. Gradually work on bending your knee as tolerated. See below         6. Crutches will be provided if you do not already have them. Please use crutches, partial weight bearing as tolerated for the first 2 days. After that, you can be fully weight bearing and gradually wean off the crutches as you tolerate. If you are limping a lot, we recommend that you stay with at least one crutch until your gait is improved. 7. Medication Instructions : Hydrocodone and Zofran - sent to pharmacy on record.         It is ok to supplement with Ibuprofen or Aleve                PLEASE TAKE 1 ASPIRIN (325mg ) TWICE A DAY FOR 10 DAYS            ALL NARCOTICS CAN CAUSE CONSTIPATION, SO WE RECOMMEND THAT YOU DRINK PLENTY OF WATER AND TAKE AN OTC STOOL SOFTENER              SUCH AS MIRALAX  Or COLACE 8.  A post- op appointment has already been made for you. __4/28 AT 3:30PM with Sierra Joel PA-C ____ Please call the office if you need change the date or               time of your appointment              Signs and Symptoms to be Aware of: If any of the following signs and symptoms occur, you should contact Dr. Oswald Montenegro office. Please be advised if a problem arises which you feel requires immediate medical attention or you are unable to contact Dr. Oswald Montenegro office you should seek immediate medical attention at the emergency department or other health care facility you have access to. Signs and symptoms to watch for include:     1. A sudden increase in swelling and l or redness or warmth at the area your surgery was performed which isn't relieved by rest, ice and elevation. 2 Oral temperature greater than 101 degrees for 12 hours or more which isn't relieved by an increase in fluid intake and Tylenol. 3 Excessive drainage from your incisions, or drainage which hasn't stopped by 72 hours after your surgery despite applying a compressive dressing, ice and elevation. 4 Calf pain, tenderness, redness or swelling which isn't relieved with rest and elevation. 5 Fever, chills, shortness ofbreath, chest pain, nausea, vomiting or other signs and symptoms which are of concern to you. 8. Thank you for following the above instructions. If you have any questions, please call the office to speak with one of my assistants, or feel free to communicate with us through [address] [place] [address] [id number]. Post Operative Exercises      Quad sets    1. Sit with your leg straight and supported on the floor or a firm bed. (If you feel discomfort in the front or back of your knee, place a small towel roll under your knee.)  2. Tighten the muscles on top of your thigh by pressing the back of your knee flat down to the floor.  (If you feel discomfort under your kneecap, place a small towel roll under your knee.)  3. Hold for about 6 seconds, then rest up to 10 seconds. 4. Do 8 to 12 repetitions several times a day. Straight-leg raises to the front    1. Lie on your back with your good knee bent so that your foot rests flat on the floor. Your injured leg should be straight. Make sure that your low back has a normal curve. You should be able to slip your flat hand in between the floor and the small of your back, with your palm touching the floor and your back touching the back of your hand. 2. Tighten the thigh muscles in the injured leg by pressing the back of your knee flat down to the floor. Hold your knee straight. 3. Keeping the thigh muscles tight, lift your injured leg up so that your heel is about 12 inches off the floor. Hold for 5 seconds and then lower slowly. 4. Do 8 to 12 repetitions. Advance to doing this exercise from a sitting position. Heel Slides :   ¨  Lie down on the floor or the bed with your leg flat. Slowly begin to slide your heel toward your rear end (buttocks), keeping your heel on the floor. Your knee will begin to bend. Slide your heel and bend your knee until it becomes a little sore and you can feel a small amount of pressure inside your knee. Hold this position for 10 seconds. Slide your heel back down until your leg is straight on the floor. Relax for 10 seconds. Repeat this exercise 20 times. ______________________________________________________________________    Anesthesia Discharge Instructions    After general anesthesia or intervenous sedation, for 24 hours or while taking prescription Narcotics:  · Limit your activities  · Do not drive or operate hazardous machinery  · If you have not urinated within 8 hours after discharge, please contact your surgeon on call.   · Do not make important personal or business decisions  · Do not drink alcoholic beverages    Report the following to your surgeon:  · Excessive pain, swelling, redness or odor of or around the surgical area  · Temperature over 100.5 degrees  · Nausea and vomiting lasting longer than 4 hours or if unable to take medication  · Any signs of decreased circulation or nerve impairment to extremity:  Change in color, persistent numbness, tingling, coldness or increased pain. · Any questions      Patient Education   Learning About Coronavirus [phone])  Coronavirus [phone]): Overview  What is coronavirus ([id number]-93)? The coronavirus disease (COVID-19) is caused by a virus. It is an illness that was first found in [place], [place], in December 2019. It has since spread worldwide. The virus can cause fever, cough, and trouble breathing. In severe cases, it can cause pneumonia and make it hard to breathe without help. It can cause death. Coronaviruses are a large group of viruses. They cause the common cold. They also cause more serious illnesses like Middle East respiratory syndrome (MERS) and severe acute respiratory syndrome (SARS). COVID-19 is caused by a novel coronavirus. That means it's a new type that has not been seen in people before. This virus spreads person-to-person through droplets from coughing and sneezing. It can also spread when you are close to someone who is infected. And it can spread when you touch something that has the virus on it, such as a doorknob or a tabletop. What can you do to protect yourself from coronavirus (COVID-19)? The best way to protect yourself from getting sick is to:  · Avoid areas where there is an outbreak. · Avoid contact with people who may be infected. · Wash your hands often with soap or alcohol-based hand sanitizers. · Avoid crowds and try to stay at least 6 feet away from other people. · Wash your hands often, especially after you cough or sneeze. Use soap and water, and scrub for at least 20 seconds. If soap and water aren't available, use an alcohol-based hand .   · Avoid touching your mouth, nose, and eyes. What can you do to avoid spreading the virus to others? To help avoid spreading the virus to others:  · Cover your mouth with a tissue when you cough or sneeze. Then throw the tissue in the trash. · Use a disinfectant to clean things that you touch often. · Stay home if you are sick or have been exposed to the virus. Don't go to school, work, or public areas. And don't use public transportation. · If you are sick:  ? Leave your home only if you need to get medical care. But call the doctor's office first so they know you're coming. And wear a face mask, if you have one.  ? If you have a face mask, wear it whenever you're around other people. It can help stop the spread of the virus when you cough or sneeze. ? Clean and disinfect your home every day. Use household  and disinfectant wipes or sprays. Take special care to clean things that you grab with your hands. These include doorknobs, remote controls, phones, and handles on your refrigerator and microwave. And don't forget countertops, tabletops, bathrooms, and computer keyboards. When to call for help  Call 911 anytime you think you may need emergency care. For example, call if:  · You have severe trouble breathing. (You can't talk at all.)  · You have constant chest pain or pressure. · You are severely dizzy or lightheaded. · You are confused or can't think clearly. · Your face and lips have a blue color. · You pass out (lose consciousness) or are very hard to wake up. Call your doctor now if you develop symptoms such as:  · Shortness of breath. · Fever. · Cough. If you need to get care, call ahead to the doctor's office for instructions before you go. Make sure you wear a face mask, if you have one, to prevent exposing other people to the virus. Where can you get the latest information? The following health organizations are tracking and studying this virus.  Their websites contain the most up-to-date information. Ange Wallace also learn what to do if you think you may have been exposed to the virus. · U.S. Centers for Disease Control and Prevention (CDC): The CDC provides updated news about the disease and travel advice. The website also tells you how to prevent the spread of infection. www.cdc.gov  · World Health Organization [facility]): WHO offers information about the virus outbreaks. WHO also has travel advice. www.who.int  Current as of: April 1, 2020               Content Version: 12.4  © 2006-2020 Health[employer]. Care instructions adapted under license by your healthcare professional. If you have questions about a medical condition or this instruction, always ask your healthcare professional. [address] any warranty or liability for your use of this information.

## 2021-04-16 NOTE — PERIOP NOTES
Discharge instructions reviewed with the patient and his wife, Michelle Nation, via telephone. All questions answered.

## 2021-04-16 NOTE — OP NOTES
Operative Report      Patient: Zuleyma Young MD MRN: [id number]  SSN: [id number]    YOB: 1965  Age: 50 y.o. 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 male with a known history of right knee pain and swelling in the right knee. Preoperative physical examination, radiographs, and magnetic resonance imaging demonstrated a possible knee nodular synovitis in His right knee. He is now electively admitted for knee arthroscopy. Procedure in Detail: After the procedure was explained to the patient, including the risks, benefits and possible complications, the patient signed the informed consent. Following identification, the patient was taken to the operating suite. Following induction of general anesthesia and administration of antibiotic, the patient was positioned on the operating table in the supine fashion. The right knee was then examined under anesthesia. The patient was noted to have a negative Lachman and pivot shift. At this point, the right leg was then prepped and draped in sterile fashion. The right leg was then elevated and exsanguinated with an Esmarch bandage. The tourniquet was elevated to 300 mmHg. At this point, a lateral portal was created and the joint was distended and fully inspected. The scope was introduced into the knee. Diagnostic arthroscopy commenced. Suprapatellar pouch and medial and lateral gutters were visualized. The undersurface of the patella and trochlea groove were well visualized. There was grade  3 and 4 chondromalacia of the patella and grade 3 and 4 chondromalacia of the trochlea. Both were debrided down to stable tissue, leaving a central trochlear lesion that was 1 x 1.5 cm in size that was grade 4 and a patella lesion that was 1x1 cm that was grade 3 and a small area of grade 4. There was also significant articular cartilage debris in the joint that was due to the patellar and trochlear lesions and was all carefully debrided. The scope was then advanced into the medial compartment. The patient's leg was flexed 30 degrees. The medial  compartment was visualized in its entirety and a medial portal was created from outside in using a spinal needle for localization. The medial compartment was thoroughly evaluated and the meniscus visualized. There was no meniscal tear. The medial tibial plateau was normal. The medial femoral condyle was normal.  The scope was then advanced to the notch. The anterior cruciate ligament was not torn. The posterior cruciate ligament was intact. The scope was then introduced to the lateral compartment. The patient's legs were put in the figure-of four position. Lateral meniscus was visualized in its entirety, both above and below the popliteal hiatus. The lateral meniscus was normal. . The articular surfaces were normal. The lateral gutter was carefully examined as the pre-op films and MRI had shown an area of calcification diagnosed preliminarily as Nodular Synovitis. No synovitis was seen but the LCL was seen to be calcified. As the calcification was inherehent to the ligament it was limitedly debrided but not removed entirely. The knee was then drained and the portals closed with 2-0 nylon sutures. The knee was then injected with 30cc's of Ropivicaine.  The knee was dressed with a sterile dressing and an ACE bandage and the patient was awakened and taken to the PACU in stable condition. The patient tolerated the procedure well, there were no complications, no drains were placed. Signed: Dennis Camarena MD (4/16/2021 at 9:16 AM)

## 2021-04-16 NOTE — ANESTHESIA POSTPROCEDURE EVALUATION
Post-Anesthesia Evaluation and Assessment    Patient: Flor Quick MD MRN: [id number]  SSN: [id number]    YOB: 1965  Age: 50 y.o. Sex: male      I have evaluated the patient and they are stable and ready for discharge from the PACU. Cardiovascular Function/Vital Signs  Visit Vitals  /89   Pulse 69   Temp 36.4 °C (97.5 °F)   Resp 11   Ht 5' 9.5\" (1.765 m)   Wt 70.3 kg (154 lb 15.7 oz)   SpO2 100%   BMI 22.56 kg/m²       Patient is status post General anesthesia for Procedure(s):  RIGHT KNEE ARTHROSCOPY PARTIAL SYNOVECTOMY AND CHONDROPLASTY. Nausea/Vomiting: None    Postoperative hydration reviewed and adequate. Pain:  Pain Scale 1: Numeric (0 - 10) (04/16/21 0361)  Pain Intensity 1: 2 (04/16/21 5983)   Managed    Neurological Status:   Neuro (WDL): Within Defined Limits (04/16/21 0624)   At baseline    Mental Status, Level of Consciousness: Alert and  oriented to person, place, and time    Pulmonary Status:   O2 Device: Nasal cannula (04/16/21 0851)   Adequate oxygenation and airway patent    Complications related to anesthesia: None    Post-anesthesia assessment completed. No concerns    Signed By: Jessie Singleton MD     April 16, 2021              Procedure(s):  RIGHT KNEE ARTHROSCOPY PARTIAL SYNOVECTOMY AND CHONDROPLASTY. general    <BSHSIANPOST>    INITIAL Post-op Vital signs:   Vitals Value Taken Time   /81 04/16/21 0855   Temp 36.4 °C (97.5 °F) 04/16/21 0851   Pulse 62 04/16/21 0858   Resp 10 04/16/21 0857   SpO2 98 % 04/16/21 0858   Vitals shown include unvalidated device data.

## 2021-11-22 NOTE — PROGRESS NOTES
Patient's Pako Goon has not been answered yet, [facility] has requested about status, checked still no answer. 67.92

## 2022-05-19 DIAGNOSIS — L30.8 OTHER ECZEMA: ICD-10-CM

## 2022-05-19 RX ORDER — CLOBETASOL PROPIONATE 0.5 MG/G
OINTMENT TOPICAL 2 TIMES DAILY
Qty: 30 G | Refills: 1 | Status: SHIPPED | OUTPATIENT
Start: 2022-05-19

## 2022-07-24 ENCOUNTER — TELEPHONE (OUTPATIENT)
Dept: PRIMARY CARE CLINIC | Age: 57
End: 2022-07-24

## 2022-07-24 DIAGNOSIS — U07.1 COVID-19 VIRUS INFECTION: Primary | ICD-10-CM

## 2022-07-24 RX ORDER — METHYLPREDNISOLONE 4 MG/1
TABLET ORAL
Qty: 1 DOSE PACK | Refills: 0 | Status: SHIPPED | OUTPATIENT
Start: 2022-07-24 | End: 2022-07-30

## 2022-07-24 NOTE — TELEPHONE ENCOUNTER
The patient called [facility] today, stating that he began to develop Covid symptoms yesterday. He tested positive today. He endorses some mild upper respiratory symptoms. Denies any SOB/Wheezing. 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 Discussed with him that he can return to work once asymptomatic and he should wear a Level-3 Mask. Medrol Dosepak sent to pharmacy.

## 2023-05-18 RX ORDER — CLOBETASOL PROPIONATE 0.5 MG/G
OINTMENT TOPICAL 2 TIMES DAILY
COMMUNITY
Start: 2022-05-19

## 2023-05-18 RX ORDER — ONDANSETRON 4 MG/1
4 TABLET, ORALLY DISINTEGRATING ORAL EVERY 8 HOURS PRN
COMMUNITY
Start: 2021-04-16

## 2023-09-05 ENCOUNTER — OFFICE VISIT (OUTPATIENT)
Dept: PRIMARY CARE CLINIC | Facility: CLINIC | Age: 58
End: 2023-09-05
Payer: COMMERCIAL

## 2023-09-05 VITALS
SYSTOLIC BLOOD PRESSURE: 127 MMHG | HEIGHT: 70 IN | RESPIRATION RATE: 16 BRPM | BODY MASS INDEX: 22.36 KG/M2 | HEART RATE: 71 BPM | DIASTOLIC BLOOD PRESSURE: 76 MMHG | TEMPERATURE: 97.5 F | WEIGHT: 156.2 LBS | OXYGEN SATURATION: 99 %

## 2023-09-05 DIAGNOSIS — Z11.59 NEED FOR HEPATITIS C SCREENING TEST: ICD-10-CM

## 2023-09-05 DIAGNOSIS — Z11.4 ENCOUNTER FOR SCREENING FOR HIV: ICD-10-CM

## 2023-09-05 DIAGNOSIS — Z80.42 FAMILY HISTORY OF PROSTATE CANCER: ICD-10-CM

## 2023-09-05 DIAGNOSIS — B35.1 ONYCHOMYCOSIS: ICD-10-CM

## 2023-09-05 DIAGNOSIS — Z00.00 PHYSICAL EXAM: Primary | ICD-10-CM

## 2023-09-05 DIAGNOSIS — Z23 NEED FOR TETANUS BOOSTER: ICD-10-CM

## 2023-09-05 DIAGNOSIS — Z23 NEED FOR SHINGLES VACCINE: ICD-10-CM

## 2023-09-05 DIAGNOSIS — Z00.00 PHYSICAL EXAM: ICD-10-CM

## 2023-09-05 LAB
ERYTHROCYTE [DISTWIDTH] IN BLOOD BY AUTOMATED COUNT: 13.1 % (ref 11.5–14.5)
HCT VFR BLD AUTO: 42.6 % (ref 36.6–50.3)
HGB BLD-MCNC: 14.3 G/DL (ref 12.1–17)
MCH RBC QN AUTO: 32.2 PG (ref 26–34)
MCHC RBC AUTO-ENTMCNC: 33.6 G/DL (ref 30–36.5)
MCV RBC AUTO: 95.9 FL (ref 80–99)
NRBC # BLD: 0 K/UL (ref 0–0.01)
NRBC BLD-RTO: 0 PER 100 WBC
PLATELET # BLD AUTO: 183 K/UL (ref 150–400)
PMV BLD AUTO: 10.4 FL (ref 8.9–12.9)
RBC # BLD AUTO: 4.44 M/UL (ref 4.1–5.7)
WBC # BLD AUTO: 3.8 K/UL (ref 4.1–11.1)

## 2023-09-05 PROCEDURE — 99396 PREV VISIT EST AGE 40-64: CPT | Performed by: INTERNAL MEDICINE

## 2023-09-05 RX ORDER — ZOSTER VACCINE RECOMBINANT, ADJUVANTED 50 MCG/0.5
0.5 KIT INTRAMUSCULAR SEE ADMIN INSTRUCTIONS
Qty: 0.5 ML | Refills: 0 | Status: SHIPPED | OUTPATIENT
Start: 2023-09-05 | End: 2024-03-03

## 2023-09-05 SDOH — ECONOMIC STABILITY: INCOME INSECURITY: HOW HARD IS IT FOR YOU TO PAY FOR THE VERY BASICS LIKE FOOD, HOUSING, MEDICAL CARE, AND HEATING?: NOT HARD AT ALL

## 2023-09-05 SDOH — ECONOMIC STABILITY: FOOD INSECURITY: WITHIN THE PAST 12 MONTHS, YOU WORRIED THAT YOUR FOOD WOULD RUN OUT BEFORE YOU GOT MONEY TO BUY MORE.: NEVER TRUE

## 2023-09-05 SDOH — ECONOMIC STABILITY: FOOD INSECURITY: WITHIN THE PAST 12 MONTHS, THE FOOD YOU BOUGHT JUST DIDN'T LAST AND YOU DIDN'T HAVE MONEY TO GET MORE.: NEVER TRUE

## 2023-09-05 SDOH — ECONOMIC STABILITY: HOUSING INSECURITY
IN THE LAST 12 MONTHS, WAS THERE A TIME WHEN YOU DID NOT HAVE A STEADY PLACE TO SLEEP OR SLEPT IN A SHELTER (INCLUDING NOW)?: NO

## 2023-09-05 ASSESSMENT — ENCOUNTER SYMPTOMS
COLOR CHANGE: 0
CHEST TIGHTNESS: 0
SORE THROAT: 0
CONSTIPATION: 0
COUGH: 0
DIARRHEA: 0
ABDOMINAL PAIN: 0
EYE DISCHARGE: 0
BACK PAIN: 0
SHORTNESS OF BREATH: 0
RHINORRHEA: 0

## 2023-09-05 ASSESSMENT — PATIENT HEALTH QUESTIONNAIRE - PHQ9
SUM OF ALL RESPONSES TO PHQ QUESTIONS 1-9: 0
1. LITTLE INTEREST OR PLEASURE IN DOING THINGS: 0
2. FEELING DOWN, DEPRESSED OR HOPELESS: 0
SUM OF ALL RESPONSES TO PHQ9 QUESTIONS 1 & 2: 0

## 2023-09-05 NOTE — PROGRESS NOTES
Barbara De La Rosa (:  1965) is a 66 y.o. male, Established patient, here for evaluation of the following chief complaint(s):  Establish Care (Prostate screening. /Toe nail fungus right big toe. )           ASSESSMENT/PLAN:  1. Physical exam  -     CBC; Future  -     Comprehensive Metabolic Panel; Future  -     Lipid Panel; Future  -     PSA Screening; Future  -     TSH; Future  Complete physical done today. I ordered a CBC, CMP, lipid panel, and blood work to check his PSA and TSH. Waiting for results. 2. Family history of prostate cancer  -     AFL - Cecile Campos MD, Urology, [facility])  I ordered blood work to check his PSA. I referred him to Urology. 3. Encounter for screening for HIV  -     HIV 1/2 Ag/Ab, 4TH Generation,W Rflx Confirm; Future  I ordered blood work to check for HIV. 4. Need for hepatitis C screening test  -     Hepatitis C Antibody; Future  Ordered Hepatitis C test. Waiting for results. 5. Need for shingles vaccine  -     zoster recombinant adjuvanted vaccine [facility]) 50 MCG/0.5ML SUSR injection; Inject 0.5 mLs into the muscle See Admin Instructions 1 dose now and repeat in 2-6 months, Disp-0.5 mL, R-0Normal sent to pharmacy. I sent his shingles vaccine to the pharmacy. 6. Need for tetanus booster  -     Tetanus-Diphth-Acell Pertussis (Malachi Schroeder) 5-2.5-18.5 LF-MCG/0.5 injection; Inject 0.5 mLs into the muscle once for 1 dose, Disp-1 each, R-0Normal sent to pharmacy. I sent a Tdap vaccine to the pharmacy. 7. Onychomycosis  -     ciclopirox (PENLAC) 8 % solution; Apply topically nightly., Disp-6 mL, R-1, Normal sent to pharmacy. I prescribed ciclopirox. Potential side effects were discussed. Subjective   SUBJECTIVE/OBJECTIVE:  HPI    Patient presents today to reestablish care. He is not fasting today as he ate egg whites for breakfast.    He states that he had COVID in last July.   He was prescribed a medrol dosepack and found it

## 2023-09-06 LAB
ALBUMIN SERPL-MCNC: 4.5 G/DL (ref 3.5–5)
ALBUMIN/GLOB SERPL: 1.4 (ref 1.1–2.2)
ALP SERPL-CCNC: 53 U/L (ref 45–117)
ALT SERPL-CCNC: 41 U/L (ref 12–78)
ANION GAP SERPL CALC-SCNC: 8 MMOL/L (ref 5–15)
AST SERPL-CCNC: 35 U/L (ref 15–37)
BILIRUB SERPL-MCNC: 0.9 MG/DL (ref 0.2–1)
BUN SERPL-MCNC: 18 MG/DL (ref 6–20)
BUN/CREAT SERPL: 19 (ref 12–20)
CALCIUM SERPL-MCNC: 10.1 MG/DL (ref 8.5–10.1)
CHLORIDE SERPL-SCNC: 100 MMOL/L (ref 97–108)
CHOLEST SERPL-MCNC: 205 MG/DL
CO2 SERPL-SCNC: 28 MMOL/L (ref 21–32)
CREAT SERPL-MCNC: 0.94 MG/DL (ref 0.7–1.3)
GLOBULIN SER CALC-MCNC: 3.2 G/DL (ref 2–4)
GLUCOSE SERPL-MCNC: 99 MG/DL (ref 65–100)
HCV AB SER IA-ACNC: 0.07 INDEX
HCV AB SERPL QL IA: NONREACTIVE
HDLC SERPL-MCNC: 125 MG/DL
HDLC SERPL: 1.6 (ref 0–5)
HIV 1+2 AB+HIV1 P24 AG SERPL QL IA: NONREACTIVE
HIV 1/2 RESULT COMMENT: NORMAL
LDLC SERPL CALC-MCNC: 70.4 MG/DL (ref 0–100)
POTASSIUM SERPL-SCNC: 4.6 MMOL/L (ref 3.5–5.1)
PROT SERPL-MCNC: 7.7 G/DL (ref 6.4–8.2)
PSA SERPL-MCNC: 0.8 NG/ML (ref 0.01–4)
SODIUM SERPL-SCNC: 136 MMOL/L (ref 136–145)
TRIGL SERPL-MCNC: 48 MG/DL
TSH SERPL DL<=0.05 MIU/L-ACNC: 1.09 UIU/ML (ref 0.36–3.74)
VLDLC SERPL CALC-MCNC: 9.6 MG/DL

## 2025-01-02 NOTE — BRIEF OP NOTE
BRIEF OPERATIVE NOTE    Date of Procedure: 1/4/2019   Preoperative Diagnosis: RIGHT INGUINAL HERNIA  Postoperative Diagnosis: Bilateral INGUINAL HERNIA    Procedure(s):  ROBOTIC ASSISTED RIGHT AND LEFT INGUINAL HERNIA REPAIR WITH MESH  Surgeon(s) and Role:     * Roney Schmidt MD - Primary         Surgical Staff:  Circ-1: Benton Gutiérrez RN  Circ-2: Cassandra Roman RN  Scrub Tech-1: Cady Corrigan  Surg Asst-1: Maximiliano Bryan  Surg Asst-2: Len Beaver I  Event Time In Time Out   Incision Start 1195    Incision Close       Anesthesia: General   Estimated Blood Loss: minimal  Specimens: * No specimens in log *   Findings: right indirect and bilateral femoral hernias   Complications: none  Implants:   Implant Name Type Inv.  Item Serial No.  Lot No. LRB No. Used Action   MESH KRISTIAN 6X4IN LT -- PARIETEX - SNA  MESH KRISTIAN 6X4IN LT -- PARIETEX NA COVIDIEN  SURGICAL [id number] Left 1 Implanted   MESH KRISTIAN 6X4IN RT -- PARIETEX - SNA  MESH KRISTIAN 6X4IN RT -- PARIETEX NA COVIDIEN  SURGICAL [id number] Right 1 Implanted Attending with

## (undated) DEVICE — LIGHT HANDLE: Brand: DEVON

## (undated) DEVICE — 4.5 MM INCISOR PLUS STRAIGHT                                    BLADES, POWER/EP-1, VIOLET, PACKAGED                                    6 PER BOX, STERILE

## (undated) DEVICE — STERILE POLYISOPRENE POWDER-FREE SURGICAL GLOVES: Brand: PROTEXIS

## (undated) DEVICE — SUTURE VCRL SZ 3-0 L27IN ABSRB UD L26MM SH 1/2 CIR J416H

## (undated) DEVICE — AIRSEAL BIFURCATED SMOKE EVAC FILTERED TUBE SET: Brand: AIRSEAL

## (undated) DEVICE — ARTHROSCOPY RICHMOND-LF: Brand: MEDLINE INDUSTRIES, INC.

## (undated) DEVICE — CLICKLINE SCISSORS INSERT: Brand: CLICKLINE

## (undated) DEVICE — 3000CC GUARDIAN II: Brand: GUARDIAN

## (undated) DEVICE — VISUALIZATION SYSTEM: Brand: CLEARIFY

## (undated) DEVICE — DEVICE TRNSF SPIK STL 2008S] MICROTEK MEDICAL INC]

## (undated) DEVICE — TIP COVER ACCESSORY

## (undated) DEVICE — TRAY CATH OD16FR SIL URIN M STATLOK STBL DEV SURSTP

## (undated) DEVICE — SUTURE SZ 0 27IN 5/8 CIR UR-6  TAPER PT VIOLET ABSRB VICRYL J603H

## (undated) DEVICE — COVER,MAYO STAND,STERILE: Brand: MEDLINE

## (undated) DEVICE — DRAPE,EXTREMITY,89X128,STERILE: Brand: MEDLINE

## (undated) DEVICE — TUBING FLTR PLUME AWAY EVAC W/ SUCT DEV DISP PUREVIEW

## (undated) DEVICE — BLADELESS OBTURATOR: Brand: WECK VISTA

## (undated) DEVICE — ARM DRAPE

## (undated) DEVICE — DEVON™ KNEE AND BODY STRAP 60" X 3" (1.5 M X 7.6 CM): Brand: DEVON

## (undated) DEVICE — HANDLE LT SNAP ON ULT DURABLE LENS FOR TRUMPF ALC DISPOSABLE

## (undated) DEVICE — SPONGE GZ W4XL4IN COT 12 PLY TYP VII WVN C FLD DSGN

## (undated) DEVICE — SUTURE VCRL SZ 3-0 L27IN ABSRB VLT L26MM SH 1/2 CIR J316H

## (undated) DEVICE — (D)PREP SKN CHLRAPRP APPL 26ML -- CONVERT TO ITEM 371833

## (undated) DEVICE — SUT ETHLN 3-0 18IN PS1 BLK --

## (undated) DEVICE — Device

## (undated) DEVICE — INFECTION CONTROL KIT SYS

## (undated) DEVICE — 4-PORT MANIFOLD: Brand: NEPTUNE 2

## (undated) DEVICE — DRAPE,REIN 53X77,STERILE: Brand: MEDLINE

## (undated) DEVICE — PADDING CST 4INX4YD --

## (undated) DEVICE — NEEDLE HYPO 22GA L1.5IN BLK S STL HUB POLYPR SHLD REG BVL

## (undated) DEVICE — BLADELESS OPTICAL TROCAR WITH FIXATION CANNULA: Brand: VERSAPORT

## (undated) DEVICE — DYONICS 25 INFLOW/OUTFLOW TUBE                                    SET, SINGLE SUCTION, 3 PER BOX

## (undated) DEVICE — APPLICATOR BNDG 1MM ADH PREMIERPRO EXOFIN

## (undated) DEVICE — KENDALL SCD EXPRESS SLEEVES, KNEE LENGTH, MEDIUM: Brand: KENDALL SCD

## (undated) DEVICE — ELECTRO LUBE IS A SINGLE PATIENT USE DEVICE THAT IS INTENDED TO BE USED ON ELECTROSURGICAL ELECTRODES TO REDUCE STICKING.: Brand: KEY SURGICAL ELECTRO LUBE

## (undated) DEVICE — 3M™ IOBAN™ 2 ANTIMICROBIAL INCISE DRAPE 6650EZ: Brand: IOBAN™ 2

## (undated) DEVICE — REM POLYHESIVE ADULT PATIENT RETURN ELECTRODE: Brand: VALLEYLAB

## (undated) DEVICE — SUTURE MCRYL SZ 4-0 L27IN ABSRB UD L19MM PS-2 1/2 CIR PRIM Y426H

## (undated) DEVICE — SOLUTION IRRIG 3000ML LAC RINGERS ARTHROMTC PLAS CONT

## (undated) DEVICE — SEAL UNIV 5-8MM DISP BX/10 -- DA VINCI XI - SNGL USE

## (undated) DEVICE — STRAP,POSITIONING,KNEE/BODY,FOAM,4X60": Brand: MEDLINE

## (undated) DEVICE — ZIMMER® STERILE DISPOSABLE TOURNIQUET CUFF WITH PLC, DUAL PORT, SINGLE BLADDER, 24 IN. (61 CM)

## (undated) DEVICE — STERILE POLYISOPRENE POWDER-FREE SURGICAL GLOVES WITH EMOLLIENT COATING: Brand: PROTEXIS

## (undated) DEVICE — SOLUTION SURG PREP 26 CC PURPREP

## (undated) DEVICE — SURGICAL PROCEDURE KIT GEN LAPAROSCOPY LF

## (undated) DEVICE — SOL IRRIGATION INJ NACL 0.9% 500ML BTL

## (undated) DEVICE — SUTURE VLOC 90 2/0 VL 6 GS-22 VLOCM2105

## (undated) DEVICE — TROCAR SITE CLOSURE DEVICE: Brand: ENDO CLOSE